# Patient Record
Sex: MALE | NOT HISPANIC OR LATINO | Employment: FULL TIME | URBAN - METROPOLITAN AREA
[De-identification: names, ages, dates, MRNs, and addresses within clinical notes are randomized per-mention and may not be internally consistent; named-entity substitution may affect disease eponyms.]

---

## 2017-05-18 ENCOUNTER — OFFICE VISIT (OUTPATIENT)
Dept: FAMILY MEDICINE | Facility: CLINIC | Age: 32
End: 2017-05-18
Payer: COMMERCIAL

## 2017-05-18 VITALS
HEART RATE: 72 BPM | SYSTOLIC BLOOD PRESSURE: 112 MMHG | DIASTOLIC BLOOD PRESSURE: 80 MMHG | BODY MASS INDEX: 28.89 KG/M2 | RESPIRATION RATE: 16 BRPM | TEMPERATURE: 98 F | WEIGHT: 222 LBS | OXYGEN SATURATION: 99 %

## 2017-05-18 DIAGNOSIS — R11.10 REGURGITATION OF FOOD: ICD-10-CM

## 2017-05-18 DIAGNOSIS — R13.10 DYSPHAGIA, UNSPECIFIED TYPE: ICD-10-CM

## 2017-05-18 DIAGNOSIS — R12 HEART BURN: Primary | ICD-10-CM

## 2017-05-18 DIAGNOSIS — R10.11 RUQ ABDOMINAL PAIN: ICD-10-CM

## 2017-05-18 DIAGNOSIS — R10.13 EPIGASTRIC ABDOMINAL PAIN: ICD-10-CM

## 2017-05-18 LAB
BASOPHILS # BLD AUTO: 0 10E9/L (ref 0–0.2)
BASOPHILS NFR BLD AUTO: 0.3 %
DIFFERENTIAL METHOD BLD: NORMAL
EOSINOPHIL # BLD AUTO: 0.2 10E9/L (ref 0–0.7)
EOSINOPHIL NFR BLD AUTO: 2.4 %
ERYTHROCYTE [DISTWIDTH] IN BLOOD BY AUTOMATED COUNT: 13 % (ref 10–15)
HCT VFR BLD AUTO: 44.5 % (ref 40–53)
HGB BLD-MCNC: 15.2 G/DL (ref 13.3–17.7)
LYMPHOCYTES # BLD AUTO: 2.8 10E9/L (ref 0.8–5.3)
LYMPHOCYTES NFR BLD AUTO: 37.7 %
MCH RBC QN AUTO: 29.9 PG (ref 26.5–33)
MCHC RBC AUTO-ENTMCNC: 34.2 G/DL (ref 31.5–36.5)
MCV RBC AUTO: 88 FL (ref 78–100)
MONOCYTES # BLD AUTO: 0.7 10E9/L (ref 0–1.3)
MONOCYTES NFR BLD AUTO: 9.9 %
NEUTROPHILS # BLD AUTO: 3.7 10E9/L (ref 1.6–8.3)
NEUTROPHILS NFR BLD AUTO: 49.7 %
PLATELET # BLD AUTO: 297 10E9/L (ref 150–450)
RBC # BLD AUTO: 5.08 10E12/L (ref 4.4–5.9)
WBC # BLD AUTO: 7.5 10E9/L (ref 4–11)

## 2017-05-18 PROCEDURE — 83516 IMMUNOASSAY NONANTIBODY: CPT | Performed by: FAMILY MEDICINE

## 2017-05-18 PROCEDURE — 99214 OFFICE O/P EST MOD 30 MIN: CPT | Performed by: FAMILY MEDICINE

## 2017-05-18 PROCEDURE — 83690 ASSAY OF LIPASE: CPT | Performed by: FAMILY MEDICINE

## 2017-05-18 PROCEDURE — 80053 COMPREHEN METABOLIC PANEL: CPT | Performed by: FAMILY MEDICINE

## 2017-05-18 PROCEDURE — 85025 COMPLETE CBC W/AUTO DIFF WBC: CPT | Performed by: FAMILY MEDICINE

## 2017-05-18 PROCEDURE — 83516 IMMUNOASSAY NONANTIBODY: CPT | Mod: 91 | Performed by: FAMILY MEDICINE

## 2017-05-18 PROCEDURE — 36415 COLL VENOUS BLD VENIPUNCTURE: CPT | Performed by: FAMILY MEDICINE

## 2017-05-18 NOTE — LETTER
[facility]   [address] [place]   [address]  [phone]    May 22, 2017      Johan Cole  [address] [facility] [address]              Dear Mr. Cole,    Results within acceptable limits.  -Liver and gallbladder tests are normal. (ALT,AST, Alk phos, bilirubin), kidney function is normal (Cr, GFR), Sodium is normal, Potassium is normal, Calcium is normal, Glucose is normal (diabetes screening test).   -Normal red blood cell (hgb) levels, normal white blood cell count and normal platelet levels.  normal lipase ( no pancreatic inflammation )   All very reassuring results  .    Results for orders placed or performed in visit on 05/18/17   CBC with platelets differential   Result Value Ref Range    WBC 7.5 4.0 - 11.0 10e9/L    RBC Count 5.08 4.4 - 5.9 10e12/L    Hemoglobin 15.2 13.3 - 17.7 g/dL    Hematocrit 44.5 40.0 - 53.0 %    MCV 88 78 - 100 fl    MCH 29.9 26.5 - 33.0 pg    MCHC 34.2 31.5 - 36.5 g/dL    RDW 13.0 10.0 - 15.0 %    Platelet Count 297 150 - 450 10e9/L    Diff Method Automated Method     % Neutrophils 49.7 %    % Lymphocytes 37.7 %    % Monocytes 9.9 %    % Eosinophils 2.4 %    % Basophils 0.3 %    Absolute Neutrophil 3.7 1.6 - 8.3 10e9/L    Absolute Lymphocytes 2.8 0.8 - 5.3 10e9/L    Absolute Monocytes 0.7 0.0 - 1.3 10e9/L    Absolute Eosinophils 0.2 0.0 - 0.7 10e9/L    Absolute Basophils 0.0 0.0 - 0.2 10e9/L   Comprehensive metabolic panel (BMP + Alb, Alk Phos, ALT, AST, Total. Bili, TP)   Result Value Ref Range    Sodium 139 133 - 144 mmol/L    Potassium 4.3 3.4 - 5.3 mmol/L    Chloride 105 94 - 109 mmol/L    Carbon Dioxide 27 20 - 32 mmol/L    Anion Gap 7 3 - 14 mmol/L    Glucose 83 70 - 99 mg/dL    Urea Nitrogen 16 7 - 30 mg/dL    Creatinine 1.00 0.66 - 1.25 mg/dL    GFR Estimate 88 >60 mL/min/1.7m2    GFR Estimate If Black >90   GFR Calc   >60 mL/min/1.7m2    Calcium 9.1 8.5 - 10.1 mg/dL    Bilirubin Total 0.3 0.2 - 1.3 mg/dL     Albumin 4.3 3.4 - 5.0 g/dL    Protein Total 7.7 6.8 - 8.8 g/dL    Alkaline Phosphatase 81 40 - 150 U/L    ALT 46 0 - 70 U/L    AST 25 0 - 45 U/L   Lipase   Result Value Ref Range    Lipase 163 73 - 393 U/L           Sincerely,    Xuan Gilbert MD/nr

## 2017-05-18 NOTE — MR AVS SNAPSHOT
After Visit Summary   5/18/2017    Johan Cole    MRN: [id number]           Patient Information     Date Of Birth          1985        Visit Information        Provider Department      5/18/2017 4:20 PM Xuan Gilbert MD [facility]        Today's Diagnoses     Heart burn    -  1    RUQ abdominal pain        Epigastric abdominal pain        Dysphagia, unspecified type        Regurgitation of food          Care Instructions    Suspect gastritis vs peptic ulcer disease need to rule out gall bladder issues   Cut back on caffeine  Limit alcohol  Eat frequent small meals  Avoid foods that make it worse  Prilosec 20 mg daily 2 weeks then Zantac 150 mg twice a day till see gastro  Labs and stool test today   ultrasound abdomen     Follow up primary in 1 month for recheck  Go to urgent care/ Er if worse         Follow-ups after your visit        Additional Services     GASTROENTEROLOGY ADULT REF CONSULT ONLY       Preferred Location: [facility] [phone], [facility]: [phone] and [facility] [phone]      Please be aware that coverage of these services is subject to the terms and limitations of your health insurance plan.  Call member services at your health plan with any benefit or coverage questions.  Any procedures must be performed at a [facility] facility OR coordinated by your clinic's referral office.    Please bring the following with you to your appointment:    (1) Any X-Rays, CTs or MRIs which have been performed.  Contact the facility where they were done to arrange for  prior to your scheduled appointment.    (2) List of current medications   (3) This referral request   (4) Any documents/labs given to you for this referral                  Your next 10 appointments already scheduled     May 19, 2017  4:00 PM CDT   US ABDOMEN LIMITED with [id number]   [facility] ([facility])    [address]  [facility] [address] [phone]           Please bring a list of your medicines (including vitamins, minerals and over-the-counter drugs). Also, tell your doctor about any allergies you may have. Wear comfortable clothes and leave your valuables at home.  Adults: No eating or drinking for 8 hours before the exam. You may take medicine with a small sip of water.  Children: - Children 6+ years: No food or drink for 6 hours before exam. - Children 1-5 years: No food or drink for 4 hours before exam. - Infants, breast-fed: may have breast milk up to 2 hours before exam. - Infants, formula: may have bottle until 4 hours before exam.  Please call the Imaging Department at your exam site with any questions.            Jun 16, 2017  8:00 AM CDT   (Arrive by 7:45 AM)   New Patient Visit with FRANK Stokes [facility] ([facility])    [address]  [address] [facility] [id number] [phone]              Future tests that were ordered for you today     Open Future Orders        Priority Expected Expires Ordered    US Abdomen Limited Routine  5/18/2018 5/18/2017    H Pylori antigen, stool Routine  6/17/2017 5/18/2017            Who to contact     If you have questions or need follow up information about today's clinic visit or your schedule please contact [facility] directly at [phone].  Normal or non-critical lab and imaging results will be communicated to you by MyChart, letter or phone within 4 business days after the clinic has received the results. If you do not hear from us within 7 days, please contact the clinic through MyChart or phone. If you have a critical or abnormal lab result, we will notify you by phone as soon as possible.  Submit refill requests through [email or web address] or call your pharmacy and they will forward the refill request to us. Please allow 3 business days for your refill to be completed.           "Additional Information About Your Visit        MyChart Information     [employer]text lets you send messages to your doctor, view your test results, renew your prescriptions, schedule appointments and more. To sign up, go to [email or web address] . Click on \"Log in\" on the left side of the screen, which will take you to the Welcome page. Then click on \"Sign up Now\" on the right side of the page.     You will be asked to enter the access code listed below, as well as some personal information. Please follow the directions to create your username and password.     Your access code is: [email or web address]  Expires: 2017  4:52 PM     Your access code will  in 90 days. If you need help or a new code, please call your [facility] clinic or [phone].        Care EveryWhere ID     This is your Care EveryWhere ID. This could be used by other organizations to access your [facility] medical records  [id number]        Your Vitals Were     Pulse Temperature Respirations Pulse Oximetry BMI (Body Mass Index)       72 98  F (36.7  C) (Oral) 16 99% 28.89 kg/m2        Blood Pressure from Last 3 Encounters:   17 112/80   16 108/78   16 100/64    Weight from Last 3 Encounters:   17 222 lb (100.7 kg)   16 210 lb 12 oz (95.6 kg)   16 205 lb 12 oz (93.3 kg)              We Performed the Following     CBC with platelets differential     Comprehensive metabolic panel (BMP + Alb, Alk Phos, ALT, AST, Total. Bili, TP)     GASTROENTEROLOGY ADULT REF CONSULT ONLY     Lipase     Tissue transglutaminase leila IgA and IgG        Primary Care Provider Office Phone # Fax #    Tay Vanessa Christiansen [phone]       [address] [address]        Thank you!     Thank you for choosing [facility]  for your care. Our goal is always to provide you with excellent care. Hearing back from our patients is one way we can continue to improve our " services. Please take a few minutes to complete the written survey that you may receive in the mail after your visit with us. Thank you!             Your Updated Medication List - Protect others around you: Learn how to safely use, store and throw away your medicines at www.disposemymeds.org.          This list is accurate as of: 5/18/17  5:01 PM.  Always use your most recent med list.                   Brand Name Dispense Instructions for use    EPINEPHrine 0.3 MG/0.3ML injection     2 each    Inject 0.3 mLs (0.3 mg) into the muscle once as needed for anaphylaxis       omeprazole 20 MG CR capsule    priLOSEC     20 mg as needed       ZANTAC PO      Take 150 mg by mouth At Bedtime

## 2017-05-18 NOTE — LETTER
[facility]   [address] [place]   [address]  [phone]    May 23, 2017      Johan Cole  [address] [facility] [address]            Dear Mr. Cole,    Results negative for celiac    Results for orders placed or performed in visit on 05/18/17   CBC with platelets differential   Result Value Ref Range    WBC 7.5 4.0 - 11.0 10e9/L    RBC Count 5.08 4.4 - 5.9 10e12/L    Hemoglobin 15.2 13.3 - 17.7 g/dL    Hematocrit 44.5 40.0 - 53.0 %    MCV 88 78 - 100 fl    MCH 29.9 26.5 - 33.0 pg    MCHC 34.2 31.5 - 36.5 g/dL    RDW 13.0 10.0 - 15.0 %    Platelet Count 297 150 - 450 10e9/L    Diff Method Automated Method     % Neutrophils 49.7 %    % Lymphocytes 37.7 %    % Monocytes 9.9 %    % Eosinophils 2.4 %    % Basophils 0.3 %    Absolute Neutrophil 3.7 1.6 - 8.3 10e9/L    Absolute Lymphocytes 2.8 0.8 - 5.3 10e9/L    Absolute Monocytes 0.7 0.0 - 1.3 10e9/L    Absolute Eosinophils 0.2 0.0 - 0.7 10e9/L    Absolute Basophils 0.0 0.0 - 0.2 10e9/L   Comprehensive metabolic panel (BMP + Alb, Alk Phos, ALT, AST, Total. Bili, TP)   Result Value Ref Range    Sodium 139 133 - 144 mmol/L    Potassium 4.3 3.4 - 5.3 mmol/L    Chloride 105 94 - 109 mmol/L    Carbon Dioxide 27 20 - 32 mmol/L    Anion Gap 7 3 - 14 mmol/L    Glucose 83 70 - 99 mg/dL    Urea Nitrogen 16 7 - 30 mg/dL    Creatinine 1.00 0.66 - 1.25 mg/dL    GFR Estimate 88 >60 mL/min/1.7m2    GFR Estimate If Black >90   GFR Calc   >60 mL/min/1.7m2    Calcium 9.1 8.5 - 10.1 mg/dL    Bilirubin Total 0.3 0.2 - 1.3 mg/dL    Albumin 4.3 3.4 - 5.0 g/dL    Protein Total 7.7 6.8 - 8.8 g/dL    Alkaline Phosphatase 81 40 - 150 U/L    ALT 46 0 - 70 U/L    AST 25 0 - 45 U/L   Lipase   Result Value Ref Range    Lipase 163 73 - 393 U/L   Tissue transglutaminase leila IgA and IgG   Result Value Ref Range    Tissue Transglutaminase Antibody IgA 1 <7 U/mL    Tissue Transglutaminase Leila IgG <1  Negative   <7 U/mL            Sincerely,    Xuan Gilbert MD/nr

## 2017-05-18 NOTE — PATIENT INSTRUCTIONS
Suspect gastritis vs peptic ulcer disease need to rule out gall bladder issues   Cut back on caffeine  Limit alcohol  Eat frequent small meals  Avoid foods that make it worse  Prilosec 20 mg daily 2 weeks then Zantac 150 mg twice a day till see gastro  Labs and stool test today   ultrasound abdomen     Follow up primary in 1 month for recheck  Go to urgent care/ Er if worse

## 2017-05-18 NOTE — PROGRESS NOTES
SUBJECTIVE:                                                    Johan Cole is a 32 year old male who presents to clinic today for the following health issues:      GERD/Heartburn  Has had heart burn intermittent past couple yrs, persist past 6 months to point uncomfortable.     Duration: Constant the past 6 months    Description (location/character/radiation): upper middle abdomin    Intensity:  moderate, severe    Accompanying signs and symptoms:  food getting stuck: YES, no trouble swallowing or choking, feels things get bloated  nausea/vomiting/blood: YES, vomiting, throws up undigested food   No weight loss  Gained weight   No night sweats    abdominal pain: YES  black/tarry or bloody stools: no :  Has constipation with omeprazole so tries not to take it as much .   No diarrhea   Using zantac once a day    History (similar episodes/previous evaluation): yes, was seen by Dr. Wan 12/13/16 and said to come in if it got worse    Precipitating or alleviating factors:  worse with fatty foods, spicy foods, caffeinated drinks and alcohol.  current NSAID/Aspirin use: no     Therapies tried and outcome: Omeprazole (Prilosec), Zantac (Ranitidine) and medication temporarily helpful    Zantac 150 mg once a day and then now and then omeprazole few weeks at a time, tries every other day     Past week flet good then kicking up again    Couple weeks ago felt lower abdominal cramping middle lower abdomen, no burning no freq  Felt pressure on bladder ,     No chest pain trouble breathing or palpitations     Goal is to get to a point to not take anything. For stomach even zantac      Problem list and histories reviewed & adjusted, as indicated.  Additional history: as documented    Patient Active Problem List   Diagnosis     CARDIOVASCULAR SCREENING; LDL GOAL LESS THAN 160     Bee sting allergy     History reviewed. No pertinent surgical history.    Social History   Substance Use Topics     Smoking status: Never Smoker      Smokeless tobacco: Never Used     Alcohol use Yes      Comment: 2 drinks a week      History reviewed. No pertinent family history.      Current Outpatient Prescriptions   Medication Sig Dispense Refill     RaNITidine HCl (ZANTAC PO) Take 150 mg by mouth At Bedtime       omeprazole (PRILOSEC) 20 MG CR capsule 20 mg as needed        EPINEPHrine (EPIPEN) 0.3 MG/0.3ML injection Inject 0.3 mLs (0.3 mg) into the muscle once as needed for anaphylaxis 2 each 0     Allergies   Allergen Reactions     Bees Hives     Recent Labs   Lab Test  12/13/16   1056  04/25/16   1223   LDL  87   --    HDL  45   --    TRIG  105   --    ALT   --   37   CR   --   0.79   GFRESTIMATED   --   >90  Non  GFR Calc     GFRESTBLACK   --   >90   GFR Calc     POTASSIUM   --   4.6      BP Readings from Last 3 Encounters:   05/18/17 112/80   12/13/16 108/78   04/25/16 100/64    Wt Readings from Last 3 Encounters:   05/18/17 222 lb (100.7 kg)   12/13/16 210 lb 12 oz (95.6 kg)   04/25/16 205 lb 12 oz (93.3 kg)                  Labs reviewed in EPIC    Reviewed and updated as needed this visit by clinical staff       Reviewed and updated as needed this visit by Provider         ROS:  Constitutional, HEENT, cardiovascular, pulmonary, GI, , musculoskeletal, neuro, skin, endocrine and psych systems are negative, except as otherwise noted.    OBJECTIVE:                                                    /80 (BP Location: Right arm, Patient Position: Chair, Cuff Size: Adult Large)  Pulse 72  Temp 98  F (36.7  C) (Oral)  Resp 16  Wt 222 lb (100.7 kg)  SpO2 99%  BMI 28.89 kg/m2  Body mass index is 28.89 kg/(m^2).  GENERAL: healthy, alert and no distress  EYES: Eyes grossly normal to inspection, PERRL and conjunctivae and sclerae normal  HENT: ear canals and TM's normal, nose and mouth without ulcers or lesions  NECK: no adenopathy, no asymmetry, masses, or scars and thyroid normal to palpation  RESP: lungs clear  to auscultation - no rales, rhonchi or wheezes  CV: regular rate and rhythm, normal S1 S2, no S3 or S4, no murmur, click or rub, no peripheral edema   ABDOMEN: soft, non tender, no hepatosplenomegaly, no masses and bowel sounds normal  MS: no gross musculoskeletal defects noted, no edema  SKIN: no suspicious lesions or rashes  NEURO: Normal strength and tone, mentation intact and speech normal  PSYCH: mentation appears normal, affect normal/bright    Diagnostic Test Results:  Results for orders placed or performed in visit on 05/18/17 (from the past 24 hour(s))   CBC with platelets differential   Result Value Ref Range    WBC 7.5 4.0 - 11.0 10e9/L    RBC Count 5.08 4.4 - 5.9 10e12/L    Hemoglobin 15.2 13.3 - 17.7 g/dL    Hematocrit 44.5 40.0 - 53.0 %    MCV 88 78 - 100 fl    MCH 29.9 26.5 - 33.0 pg    MCHC 34.2 31.5 - 36.5 g/dL    RDW 13.0 10.0 - 15.0 %    Platelet Count 297 150 - 450 10e9/L    Diff Method Automated Method     % Neutrophils 49.7 %    % Lymphocytes 37.7 %    % Monocytes 9.9 %    % Eosinophils 2.4 %    % Basophils 0.3 %    Absolute Neutrophil 3.7 1.6 - 8.3 10e9/L    Absolute Lymphocytes 2.8 0.8 - 5.3 10e9/L    Absolute Monocytes 0.7 0.0 - 1.3 10e9/L    Absolute Eosinophils 0.2 0.0 - 0.7 10e9/L    Absolute Basophils 0.0 0.0 - 0.2 10e9/L        ASSESSMENT/PLAN:                                                      1. Heart burn  Hx of bee sting allergy, GERD on Prilosec, seen by [facility] Dr Christiansen12/2016 for physical . [place]. Here for heart burn worse last 6 month since started new job also coincides with minor increase in stress and excessive caffeine ( 7 to 8 shots of espresso ) a day. Suspect gastritis vs peptic ulcer disease need to rule out gall bladder issues. Advised to cut back on caffeine, Limit alcohol  Eat frequent small meals, Avoid foods that make symptoms worse. Do Prilosec 20 mg daily 2 weeks then Zantac 150 mg twice a day till see gastro. Chronic use of  med's like Prilosec can cause C diff colitis, atypical fractures and pneumonia, vit B 12 and magnesium deficiency so recommended always to use lowest effective dose for shortest duration of time possible.  Call if would lance sucralfate to help with symptom until can see GI. Unclear etiology/diagnosis with uncertain prognosis requiring further workup below. Labs and stool test today. ultrasound abdomen to evaluate for gall stones. Follow up primary Dr Christiansen in 1 month for recheck. Go to urgent care/ Er if worse. Gastro referral if not better made.   - CBC with platelets differential  - Comprehensive metabolic panel (BMP + Alb, Alk Phos, ALT, AST, Total. Bili, TP)  - Lipase  - H Pylori antigen, stool; Future  - GASTROENTEROLOGY ADULT REF CONSULT ONLY  - Tissue transglutaminase leila IgA and IgG  - US Abdomen Limited; Future    2. RUQ abdominal pain  Unclear etiology/diagnosis with uncertain prognosis requiring further workup below.  - CBC with platelets differential  - Comprehensive metabolic panel (BMP + Alb, Alk Phos, ALT, AST, Total. Bili, TP)  - Lipase  - H Pylori antigen, stool; Future  - GASTROENTEROLOGY ADULT REF CONSULT ONLY  - Ranitidine HCl (ZANTAC PO); Take 150 mg by mouth At Bedtime  - Tissue transglutaminase leila IgA and IgG  - US Abdomen Limited; Future    3. Epigastric abdominal pain  Unclear etiology/diagnosis with uncertain prognosis requiring further workup below.  - CBC with platelets differential  - Comprehensive metabolic panel (BMP + Alb, Alk Phos, ALT, AST, Total. Bili, TP)  - Lipase  - H Pylori antigen, stool; Future  - GASTROENTEROLOGY ADULT REF CONSULT ONLY  - Ranitidine HCl (ZANTAC PO); Take 150 mg by mouth At Bedtime  - Tissue transglutaminase leila IgA and IgG  - US Abdomen Limited; Future    4. Dysphagia, unspecified   Unclear etiology/diagnosis with uncertain prognosis requiring further workup below.  - CBC with platelets differential  - Comprehensive metabolic panel (BMP + Alb, Alk Phos,  ALT, AST, Total. Bili, TP)  - Lipase  - H Pylori antigen, stool; Future  - GASTROENTEROLOGY ADULT REF CONSULT ONLY  - Ranitidine HCl (ZANTAC PO); Take 150 mg by mouth At Bedtime  - Tissue transglutaminase leila IgA and IgG  - US Abdomen Limited; Future    5. Regurgitation of food  Unclear etiology/diagnosis with uncertain prognosis requiring further workup below.  - CBC with platelets differential  - Comprehensive metabolic panel (BMP + Alb, Alk Phos, ALT, AST, Total. Bili, TP)  - Lipase  - H Pylori antigen, stool; Future  - GASTROENTEROLOGY ADULT REF CONSULT ONLY  - Ranitidine HCl (ZANTAC PO); Take 150 mg by mouth At Bedtime  - Tissue transglutaminase leila IgA and IgG  - US Abdomen Limited; Future    See Patient Instructions    Xuan Gilbert MD  [facility]

## 2017-05-18 NOTE — NURSING NOTE
"Chief Complaint   Patient presents with     Gastrointestinal Problem       Initial /80 (BP Location: Right arm, Patient Position: Chair, Cuff Size: Adult Large)  Pulse 72  Temp 98  F (36.7  C) (Oral)  Resp 16  Wt 222 lb (100.7 kg)  SpO2 99%  BMI 28.89 kg/m2 Estimated body mass index is 28.89 kg/(m^2) as calculated from the following:    Height as of 12/13/16: 6' 1.5\" (1.867 m).    Weight as of this encounter: 222 lb (100.7 kg).  Medication Reconciliation: complete     Leandra Clements CMA      "

## 2017-05-19 LAB
ALBUMIN SERPL-MCNC: 4.3 G/DL (ref 3.4–5)
ALP SERPL-CCNC: 81 U/L (ref 40–150)
ALT SERPL W P-5'-P-CCNC: 46 U/L (ref 0–70)
ANION GAP SERPL CALCULATED.3IONS-SCNC: 7 MMOL/L (ref 3–14)
AST SERPL W P-5'-P-CCNC: 25 U/L (ref 0–45)
BILIRUB SERPL-MCNC: 0.3 MG/DL (ref 0.2–1.3)
BUN SERPL-MCNC: 16 MG/DL (ref 7–30)
CALCIUM SERPL-MCNC: 9.1 MG/DL (ref 8.5–10.1)
CHLORIDE SERPL-SCNC: 105 MMOL/L (ref 94–109)
CO2 SERPL-SCNC: 27 MMOL/L (ref 20–32)
CREAT SERPL-MCNC: 1 MG/DL (ref 0.66–1.25)
GFR SERPL CREATININE-BSD FRML MDRD: 88 ML/MIN/1.7M2
GLUCOSE SERPL-MCNC: 83 MG/DL (ref 70–99)
LIPASE SERPL-CCNC: 163 U/L (ref 73–393)
POTASSIUM SERPL-SCNC: 4.3 MMOL/L (ref 3.4–5.3)
PROT SERPL-MCNC: 7.7 G/DL (ref 6.8–8.8)
SODIUM SERPL-SCNC: 139 MMOL/L (ref 133–144)

## 2017-05-20 NOTE — PROGRESS NOTES
Results within acceptable limits.  -Liver and gallbladder tests are normal. (ALT,AST, Alk phos, bilirubin), kidney function is normal (Cr, GFR), Sodium is normal, Potassium is normal, Calcium is normal, Glucose is normal (diabetes screening test).   -Normal red blood cell (hgb) levels, normal white blood cell count and normal platelet levels.  normal lipase ( no pancreatic inflammation )   All very reassuring results  .

## 2017-05-22 LAB
TTG IGA SER-ACNC: 1 U/ML
TTG IGG SER-ACNC: NORMAL U/ML

## 2017-06-07 ENCOUNTER — TELEPHONE (OUTPATIENT)
Dept: GASTROENTEROLOGY | Facility: CLINIC | Age: 32
End: 2017-06-07

## 2017-06-07 ENCOUNTER — PRE VISIT (OUTPATIENT)
Dept: GASTROENTEROLOGY | Facility: CLINIC | Age: 32
End: 2017-06-07

## 2017-06-07 NOTE — TELEPHONE ENCOUNTER
1.  Date/reason for appt: 6/16/17 8AM Dx: Heart burn, Epigastric abdominal pain, RUQ abdominal pain, Dysphagia, unspecified type Regurgitation   2.  Referring provider: Vladimir JONES   3.  Call to patient (Yes / No - short description): No, pt was referred.   4.  Previous care at / records requested from:  [facility] Hank Gilbert MD -- Recs are in Epic / Images in PACS

## 2017-06-08 NOTE — TELEPHONE ENCOUNTER
Called pt to check to for ED notes, pt states he no longer needs this appt and asked me to cancel the appt. - closing encounter

## 2018-05-08 ENCOUNTER — OFFICE VISIT (OUTPATIENT)
Dept: FAMILY MEDICINE | Facility: CLINIC | Age: 33
End: 2018-05-08
Payer: COMMERCIAL

## 2018-05-08 VITALS
WEIGHT: 227.25 LBS | BODY MASS INDEX: 28.26 KG/M2 | SYSTOLIC BLOOD PRESSURE: 125 MMHG | TEMPERATURE: 98 F | OXYGEN SATURATION: 97 % | HEIGHT: 75 IN | HEART RATE: 67 BPM | DIASTOLIC BLOOD PRESSURE: 82 MMHG

## 2018-05-08 DIAGNOSIS — R53.83 OTHER FATIGUE: ICD-10-CM

## 2018-05-08 DIAGNOSIS — Z13.1 DIABETES MELLITUS SCREENING: ICD-10-CM

## 2018-05-08 DIAGNOSIS — K21.9 GASTROESOPHAGEAL REFLUX DISEASE, ESOPHAGITIS PRESENCE NOT SPECIFIED: ICD-10-CM

## 2018-05-08 DIAGNOSIS — Z91.030 BEE STING ALLERGY: ICD-10-CM

## 2018-05-08 DIAGNOSIS — Z00.00 ROUTINE GENERAL MEDICAL EXAMINATION AT A HEALTH CARE FACILITY: Primary | ICD-10-CM

## 2018-05-08 DIAGNOSIS — M79.661 RIGHT CALF PAIN: ICD-10-CM

## 2018-05-08 DIAGNOSIS — Z13.6 SCREENING FOR CARDIOVASCULAR CONDITION: ICD-10-CM

## 2018-05-08 PROCEDURE — 99395 PREV VISIT EST AGE 18-39: CPT | Performed by: FAMILY MEDICINE

## 2018-05-08 PROCEDURE — 99213 OFFICE O/P EST LOW 20 MIN: CPT | Mod: 25 | Performed by: FAMILY MEDICINE

## 2018-05-08 RX ORDER — HYDROCODONE BITARTRATE AND ACETAMINOPHEN 5; 325 MG/1; MG/1
1 TABLET ORAL 3 TIMES DAILY PRN
Qty: 10 TABLET | Refills: 0 | Status: SHIPPED | OUTPATIENT
Start: 2018-05-08 | End: 2019-02-26

## 2018-05-08 RX ORDER — EPINEPHRINE 0.3 MG/.3ML
0.3 INJECTION SUBCUTANEOUS
Qty: 2 ML | Refills: 3 | Status: SHIPPED | OUTPATIENT
Start: 2018-05-08 | End: 2019-02-26

## 2018-05-08 ASSESSMENT — PATIENT HEALTH QUESTIONNAIRE - PHQ9
SUM OF ALL RESPONSES TO PHQ QUESTIONS 1-9: 14
10. IF YOU CHECKED OFF ANY PROBLEMS, HOW DIFFICULT HAVE THESE PROBLEMS MADE IT FOR YOU TO DO YOUR WORK, TAKE CARE OF THINGS AT HOME, OR GET ALONG WITH OTHER PEOPLE: VERY DIFFICULT
SUM OF ALL RESPONSES TO PHQ QUESTIONS 1-9: 14

## 2018-05-08 NOTE — LETTER
May 8, 2018      Johan Cole  [address] [address]        To Whom It May Concern:    Johan Cole was seen in our clinic. He may return to work without restrictions on 5/11/18. Please excuse him from work for 5/9 and 5/10      Sincerely,        Tay Christiansen MD, MD

## 2018-05-08 NOTE — MR AVS SNAPSHOT
After Visit Summary   5/8/2018    Johan Cole    MRN: [id number]           Patient Information     Date Of Birth          1985        Visit Information        Provider Department      5/8/2018 4:00 PM Tay Christiansen MD [facility]        Today's Diagnoses     Routine general medical examination at a health care facility    -  1    Diabetes mellitus screening        Screening for cardiovascular condition        Bee sting allergy        Right calf pain          Care Instructions      Preventive Health Recommendations  Male Ages 26 - 39    Yearly exam:             See your health care provider every year in order to  o   Review health changes.   o   Discuss preventive care.    o   Review your medicines if your doctor has prescribed any.    You should be tested each year for STDs (sexually transmitted diseases), if you re at risk.     After age 35, talk to your provider about cholesterol testing. If you are at risk for heart disease, have your cholesterol tested at least every 5 years.     If you are at risk for diabetes, you should have a diabetes test (fasting glucose).  Shots: Get a flu shot each year. Get a tetanus shot every 10 years.     Nutrition:    Eat at least 5 servings of fruits and vegetables daily.     Eat whole-grain bread, whole-wheat pasta and brown rice instead of white grains and rice.     Talk to your provider about Calcium and Vitamin D.     Lifestyle    Exercise for at least 150 minutes a week (30 minutes a day, 5 days a week). This will help you control your weight and prevent disease.     Limit alcohol to one drink per day.     No smoking.     Wear sunscreen to prevent skin cancer.     See your dentist every six months for an exam and cleaning.             Follow-ups after your visit        Additional Services     ORTHO  REFERRAL       [facility] is referring you to the Orthopedic  Services at [facility].       The [facility] Representative will assist you in the coordination of your Orthopedic and Musculoskeletal Care as prescribed by your physician.    The  Representative will call you within 1 business day to help schedule your appointment, or you may contact the [facility] Representative at:    All areas ~ [phone]     Type of Referral : Non Surgical       Timeframe requested: Routine    Coverage of these services is subject to the terms and limitations of your health insurance plan.  Please call member services at your health plan with any benefit or coverage questions.      If X-rays, CT or MRI's have been performed, please contact the facility where they were done to arrange for , prior to your scheduled appointment.  Please bring this referral request to your appointment and present it to your specialist.                  Who to contact     If you have questions or need follow up information about today's clinic visit or your schedule please contact [facility] directly at [phone].  Normal or non-critical lab and imaging results will be communicated to you by MyChart, letter or phone within 4 business days after the clinic has received the results. If you do not hear from us within 7 days, please contact the clinic through [employer] or phone. If you have a critical or abnormal lab result, we will notify you by phone as soon as possible.  Submit refill requests through [employer] or call your pharmacy and they will forward the refill request to us. Please allow 3 business days for your refill to be completed.          Additional Information About Your Visit        [employer] Information     [employer] lets you send messages to your doctor, view your test results, renew your prescriptions, schedule appointments and more. To sign up, go to [email or web address] . Click on \"Log in\" on the left side of the screen, which will take you to the Welcome page. Then click " "on \"Sign up Now\" on the right side of the page.     You will be asked to enter the access code listed below, as well as some personal information. Please follow the directions to create your username and password.     Your access code is: [email or web address]  Expires: 2018  4:27 PM     Your access code will  in 90 days. If you need help or a new code, please call your [place] clinic or [phone].        Care EveryWhere ID     This is your Care EveryWhere ID. This could be used by other organizations to access your [place] medical records  [id number]        Your Vitals Were     Pulse Temperature Height Pulse Oximetry BMI (Body Mass Index)       67 98  F (36.7  C) (Oral) 6' 2.5\" (1.892 m) 97% 28.79 kg/m2        Blood Pressure from Last 3 Encounters:   18 125/82   17 112/80   16 108/78    Weight from Last 3 Encounters:   18 227 lb 4 oz (103.1 kg)   17 222 lb (100.7 kg)   16 210 lb 12 oz (95.6 kg)              We Performed the Following     Glucose     Lipid panel reflex to direct LDL Fasting     ORTHO  REFERRAL          Today's Medication Changes          These changes are accurate as of 18  4:27 PM.  If you have any questions, ask your nurse or doctor.               Start taking these medicines.        Dose/Directions    HYDROcodone-acetaminophen 5-325 MG per tablet   Commonly known as:  NORCO   Used for:  Right calf pain   Started by:  Tay Christiansen MD        Dose:  1 tablet   Take 1 tablet by mouth 3 times daily as needed for severe pain   Quantity:  10 tablet   Refills:  0         Stop taking these medicines if you haven't already. Please contact your care team if you have questions.     omeprazole 20 MG CR capsule   Commonly known as:  priLOSEC   Stopped by:  Tay Christiansen MD                Where to get your medicines      These medications were sent to [facility] [address] [facility] [address] [place] [address] [place] [facility] [place] [address] [employer] [address]  [address] [facility] [address]     Phone:  [phone]     EPINEPHrine 0.3 MG/0.3ML injection 2-pack         Some of these will need a paper prescription and others can be bought over the counter.  Ask your nurse if you have questions.     Bring a paper prescription for each of these medications     HYDROcodone-acetaminophen 5-325 MG per tablet               Information about OPIOIDS     PRESCRIPTION OPIOIDS: WHAT YOU NEED TO KNOW   You have a prescription for an opioid (narcotic) pain medicine. Opioids can cause addiction. If you have a history of chemical dependency of any type, you are at a higher risk of becoming addicted to opioids. Only take this medicine after all other options have been tried. Take it for as short a time and as few doses as possible.     Do not:    Drive. If you drive while taking these medicines, you could be arrested for driving under the influence (DUI).    Operate heavy machinery    Do any other dangerous activities while taking these medicines.     Drink any alcohol while taking these medicines.      Take with any other medicines that contain acetaminophen. Read all labels carefully. Look for the word  acetaminophen  or  Tylenol.  Ask your pharmacist if you have questions or are unsure.    Store your pills in a secure place, locked if possible. We will not replace any lost or stolen medicine. If you don t finish your medicine, please throw away (dispose) as directed by your pharmacist. The [place] Pollution Control Agency has more information about safe disposal: https://www.pca.[facility].mn.us/living-green/managing-unwanted-medications    All opioids tend to cause constipation. Drink plenty of water and eat foods that have a lot of fiber, such as fruits, vegetables, prune juice, apple juice and high-fiber cereal. Take a laxative (Miralax, milk of magnesia, Colace, Senna) if you don t move your bowels at least every other day.          Primary Care  Provider Office Phone # Fax #    Tay Vanessa Christiansen [phone] [address] [facility] [address]        Equal Access to Services     FLORESITA SEGOVIA : Hadii aad ku hadmarjorie Silva, wacharlada luveronica, qaheidita kakarlos moore, kristi spencer sarabriana lucero jazmine hirsch. So [facility] [place] [facility] [phone].    ATENCIÓN: Si habla español, tiene a gil disposición servicios gratuitos de asistencia lingüística. Llame al [phone].    We comply with applicable federal civil rights laws and [place] laws. We do not discriminate on the basis of race, color, national origin, age, disability, sex, sexual orientation, or gender identity.            Thank you!     Thank you for choosing [facility]  for your care. Our goal is always to provide you with excellent care. Hearing back from our patients is one way we can continue to improve our services. Please take a few minutes to complete the written survey that you may receive in the mail after your visit with us. Thank you!             Your Updated Medication List - Protect others around you: Learn how to safely use, store and throw away your medicines at www.disposemymeds.org.          This list is accurate as of 5/8/18  4:27 PM.  Always use your most recent med list.                   Brand Name Dispense Instructions for use Diagnosis    EPINEPHrine 0.3 MG/0.3ML injection 2-pack    EPIPEN/ADRENACLICK/or ANY BX GENERIC EQUIV    2 mL    Inject 0.3 mLs (0.3 mg) into the muscle once as needed for anaphylaxis    Bee sting allergy       HYDROcodone-acetaminophen 5-325 MG per tablet    NORCO    10 tablet    Take 1 tablet by mouth 3 times daily as needed for severe pain    Right calf pain       ZANTAC PO      Take 150 mg by mouth At Bedtime    Epigastric abdominal pain, RUQ abdominal pain, Dysphagia, unspecified type, Regurgitation of food

## 2018-05-08 NOTE — LETTER
[facility]  [address] [place] [id number]  [phone]        November 13, 2018    Johan Cole  [address] [facility] [address]              Dear Johan Cole    This is to remind you that your Fasting lab is due.    You may call our office at [phone] to schedule an appointment.    Please disregard this notice if you have already had your labs drawn or made an appointment.        Sincerely,        Tay Christiansen MD

## 2018-05-08 NOTE — PROGRESS NOTES
SUBJECTIVE:   CC: Johan Cole is an 33 year old male who presents for preventative health visit.     Physical   Annual:     Getting at least 3 servings of Calcium per day::  NO    Bi-annual eye exam::  Yes    Dental care twice a year::  Yes    Sleep apnea or symptoms of sleep apnea::  Daytime drowsiness    Diet::  Regular (no restrictions) and Other    Frequency of exercise::  2-3 days/week    Duration of exercise::  30-45 minutes    Taking medications regularly::  Yes    Medication side effects::  None    Additional concerns today::  YES (calf muscle was hurt last night while playing softball,, it doesnt seem to be sollen but is tender and does radiate behind his knee. pt also wants to discuss his medication list )                     Today's PHQ-2 Score:   PHQ-2 ( 1999 [employer]) 5/8/2018   Q1: Little interest or pleasure in doing things 2   Q2: Feeling down, depressed or hopeless 2   PHQ-2 Score 4   Q1: Little interest or pleasure in doing things More than half the days   Q2: Feeling down, depressed or hopeless More than half the days   PHQ-2 Score 4       Abuse: Current or Past(Physical, Sexual or Emotional)- No  Do you feel safe in your environment - Yes    Social History   Substance Use Topics     Smoking status: Never Smoker     Smokeless tobacco: Never Used     Alcohol use Yes      Comment: 2 drinks a week      Alcohol Use 5/8/2018   If you drink alcohol do you typically have greater than 3 drinks per day OR greater than 7 drinks per week? No   No flowsheet data found.    Last PSA: No results found for: PSA    Reviewed orders with patient. Reviewed health maintenance and updated orders accordingly - Yes  Labs reviewed in EPIC    Reviewed and updated as needed this visit by clinical staff    Reviewed and updated as needed this visit by Provider    Right calf pain.  Was playing softball  - heard pop sound when running. Felt group of muscles are tense. Painful to walk. Hard to bend ankle much. Swollen.   No  "rupture of tendons.     Feeling tired. Gaining weight.     Having heartburn. Using zantac. Symptoms are not completely controlled but manageable.     Review of Systems  C: NEGATIVE for fever, chills, change in weight  I: NEGATIVE for worrisome rashes, moles or lesions  E: NEGATIVE for vision changes or irritation  ENT: NEGATIVE for ear, mouth and throat problems  R: NEGATIVE for significant cough or SOB  CV: NEGATIVE for chest pain, palpitations or peripheral edema  GI: NEGATIVE for nausea, abdominal pain, heartburn, or change in bowel habits   male: negative for dysuria, hematuria, decreased urinary stream, erectile dysfunction, urethral discharge  M: NEGATIVE for significant arthralgias or myalgia  N: NEGATIVE for weakness, dizziness or paresthesias  E: NEGATIVE for temperature intolerance, skin/hair changes  P: NEGATIVE for changes in mood or affect    OBJECTIVE:   /82  Pulse 67  Temp 98  F (36.7  C) (Oral)  Ht 6' 2.5\" (1.892 m)  Wt 227 lb 4 oz (103.1 kg)  SpO2 97%  BMI 28.79 kg/m2    Physical Exam  GENERAL: healthy, alert and no distress  EYES: Eyes grossly normal to inspection, PERRL and conjunctivae and sclerae normal  HENT: ear canals and TM's normal, nose and mouth without ulcers or lesions  NECK: no adenopathy, no asymmetry, masses, or scars and thyroid normal to palpation  RESP: lungs clear to auscultation - no rales, rhonchi or wheezes  CV: regular rate and rhythm, normal S1 S2, no S3 or S4, no murmur, click or rub, no peripheral edema and peripheral pulses strong  ABDOMEN: soft, nontender, no hepatosplenomegaly, no masses and bowel sounds normal   (male): normal male genitalia without lesions or urethral discharge, no hernia  MS: Right posterior calf diffuse swelling-there is mild diffuse tenderness but no palpable deformity.  There is no Achilles tenderness or deformity.  Active range of motion of ankle is painful.  Distal pulses are intact.  SKIN: no suspicious lesions or rashes  NEURO: " Normal strength and tone, mentation intact and speech normal  PSYCH: mentation appears normal, affect normal/bright     ASSESSMENT/PLAN:   1. Routine general medical examination at a health care facility      2. Diabetes mellitus screening    - Glucose; Future    3. Screening for cardiovascular condition    - Lipid panel reflex to direct LDL Fasting; Future    4. Bee sting allergy    - EPINEPHrine (EPIPEN/ADRENACLICK/OR ANY BX GENERIC EQUIV) 0.3 MG/0.3ML injection 2-pack; Inject 0.3 mLs (0.3 mg) into the muscle once as needed for anaphylaxis  Dispense: 2 mL; Refill: 3    5. Right calf pain  -. Unclear etiology.  I suspect tendinosis but tendon injury can not be ruled out.  Due to significant swelling and pain I recommended him to see a sports medicine doctor.  I will provide him a small supply of Norco.  I explained to him about controlled substance and complications related to it.  He also understand is a controlled substance and we do not refill it without seen.  I also provided him a note for his work where he needs to stand on his feet for hours.  - I strongly recommended him to use ice multiple times per day.   - HYDROcodone-acetaminophen (NORCO) 5-325 MG per tablet; Take 1 tablet by mouth 3 times daily as needed for severe pain  Dispense: 10 tablet; Refill: 0  - ORTHO  REFERRAL    6. Gastroesophageal reflux disease, esophagitis presence not specified  Stick with ranitidine and OK to use PPI as needed.     7. Other fatigue  Unclear etiology. Not related to above. Obtain labs but he would like to come back for that.   - CBC with platelets; Future  - Vitamin D Deficiency; Future  - TSH with free T4 reflex; Future    COUNSELING:   Reviewed preventive health counseling, as reflected in patient instructions  Special attention given to:        Regular exercise       Healthy diet/nutrition       Vision screening       Hearing screening         reports that he has never smoked. He has never used smokeless  "tobacco.    Estimated body mass index is 28.89 kg/(m^2) as calculated from the following:    Height as of 12/13/16: 6' 1.5\" (1.867 m).    Weight as of 5/18/17: 222 lb (100.7 kg).   Weight management plan: Discussed healthy diet and exercise guidelines and patient will follow up in 12 months in clinic to re-evaluate.    Counseling Resources:  ATP IV Guidelines  Pooled Cohorts Equation Calculator  FRAX Risk Assessment  ICSI Preventive Guidelines  Dietary Guidelines for Americans, 2010  USDA's MyPlate  ASA Prophylaxis  Lung CA Screening    Tay Christiansen MD  [facility]  Answers for HPI/ROS submitted by the patient on 5/8/2018   PHQ-2 Score: 4  If you checked off any problems, how difficult have these problems made it for you to do your work, take care of things at home, or get along with other people?: Very difficult  PHQ9 TOTAL SCORE: 14    "

## 2018-05-09 ASSESSMENT — PATIENT HEALTH QUESTIONNAIRE - PHQ9: SUM OF ALL RESPONSES TO PHQ QUESTIONS 1-9: 14

## 2018-05-10 ENCOUNTER — OFFICE VISIT (OUTPATIENT)
Dept: ORTHOPEDICS | Facility: CLINIC | Age: 33
End: 2018-05-10
Payer: COMMERCIAL

## 2018-05-10 VITALS
BODY MASS INDEX: 28.23 KG/M2 | SYSTOLIC BLOOD PRESSURE: 116 MMHG | HEIGHT: 75 IN | WEIGHT: 227 LBS | DIASTOLIC BLOOD PRESSURE: 74 MMHG

## 2018-05-10 DIAGNOSIS — S86.811A STRAIN OF CALF MUSCLE, RIGHT, INITIAL ENCOUNTER: Primary | ICD-10-CM

## 2018-05-10 PROCEDURE — 76882 US LMTD JT/FCL EVL NVASC XTR: CPT | Performed by: FAMILY MEDICINE

## 2018-05-10 PROCEDURE — 99243 OFF/OP CNSLTJ NEW/EST LOW 30: CPT | Mod: 25 | Performed by: FAMILY MEDICINE

## 2018-05-10 NOTE — PROGRESS NOTES
"ASSESSMENT & PLAN    1. Strain of calf muscle, right, initial encounter      Physical therapy: [facility] - [phone]  Discussed exam and ultrasound - grade 1 strain  Letter for work: no climbing ladders or push/pulling more than 20 pounds x 4 weeks    Follow up after 4 therapy sessions if not improving.    -----    SUBJECTIVE  Johan Cole is a/an 33 year old male who is seen in consultation at the request of  Tay Christiansen M.D. for evaluation of right knee pain. The patient is seen by themselves.    Onset: 3 day(s) ago. Patient describes injury as he felt a tear/pop in the right calf while sprinting out of the batters box while playing softball  Location of Pain: right calf- posterior/medial in the muscle belly  Rating of Pain at worst: 9/10  Rating of Pain Currently: 3/10  Worsened by: toeing off while walking, stretching, going up onto his toes  Better with: rest  Treatments tried: no treatment tried to date  Associated symptoms: swelling  Orthopedic history: DVT in right leg  Relevant surgical history: NO  Patient Social History: works as an      Patient's past medical, surgical, social, and family histories were reviewed today and no changes are noted.    REVIEW OF SYSTEMS:  10 point ROS is negative other than symptoms noted above in HPI, Past Medical History or as stated below  Constitutional: NEGATIVE for fever, chills, change in weight  Skin: NEGATIVE for worrisome rashes, moles or lesions  GI/: NEGATIVE for bowel or bladder changes  Neuro: NEGATIVE for weakness, dizziness or paresthesias    OBJECTIVE:  /74  Ht 6' 2.5\" (1.892 m)  Wt 227 lb (103 kg)  BMI 28.76 kg/m2   General: healthy, alert and in no distress  HEENT: no scleral icterus or conjunctival erythema  Skin: no suspicious lesions or rash. No jaundice.  CV: no pedal edema  Resp: normal respiratory effort without conversational dyspnea   Psych: normal mood and affect  Gait: normal steady " gait with appropriate coordination and balance  Neuro: Normal light sensory exam of lower extremity  MSK:  RIGHT CALF  Inspection:    normal alignment  Palpation:    Tender about the medial gastroc and septum at about the midportion of the calf. Remainder of bony and ligamentous landmarks are nontender.    No effusion is present  Range of Motion:   Full active plantar and dorsiflexion of the ankle  Strength:  Pain with strength testing of the calf    Independent visualization of the below image:  Limited US scan of the right calf:  History: Pain and Swelling  Findings: Scan of the posterior calf shows strain at septum of the medial/lateral gastroc.  No discrete hematoma within the medial gastroc.  Questionable strain with the soleus, medial / distal aspect.  No proximal tendinous tearing. Intact achilles. Interpretation: 1) Calf strain - grade 1    Patient's conditions were thoroughly discussed during today's visit with greater than 50% of the visit spent counseling the patient with total time spent face-to-face with the patient being 25 minutes ultrasound taking 10 minutes    Ruben Shaw DO [facility]

## 2018-05-10 NOTE — PATIENT INSTRUCTIONS
1. Strain of calf muscle, right, initial encounter      Physical therapy: [facility] - [phone]  Discussed exam and ultrasound - grade 1 strain  Letter for work: no climbing ladders or push/pulling more than 20 pounds x 4 weeks    Follow up after 4 therapy sessions if not improving.

## 2018-05-10 NOTE — LETTER
"    5/10/2018         RE: Johan Cole  [address] [facility] [address]        Dear Colleague,    Thank you for referring your patient, Johan Cole, to the [facility]. Please see a copy of my visit note below.    ASSESSMENT & PLAN    1. Strain of calf muscle, right, initial encounter      Physical therapy: [facility] - [phone]  Discussed exam and ultrasound - grade 1 strain  Letter for work: no climbing ladders or push/pulling more than 20 pounds x 4 weeks    Follow up after 4 therapy sessions if not improving.    -----    SUBJECTIVE  Johan Cole is a/an 33 year old male who is seen in consultation at the request of  Tay Christiansen M.D. for evaluation of right knee pain. The patient is seen by themselves.    Onset: 3 day(s) ago. Patient describes injury as he felt a tear/pop in the right calf while sprinting out of the batters box while playing softball  Location of Pain: right calf- posterior/medial in the muscle belly  Rating of Pain at worst: 9/10  Rating of Pain Currently: 3/10  Worsened by: toeing off while walking, stretching, going up onto his toes  Better with: rest  Treatments tried: no treatment tried to date  Associated symptoms: swelling  Orthopedic history: DVT in right leg  Relevant surgical history: NO  Patient Social History: works as an      Patient's past medical, surgical, social, and family histories were reviewed today and no changes are noted.    REVIEW OF SYSTEMS:  10 point ROS is negative other than symptoms noted above in HPI, Past Medical History or as stated below  Constitutional: NEGATIVE for fever, chills, change in weight  Skin: NEGATIVE for worrisome rashes, moles or lesions  GI/: NEGATIVE for bowel or bladder changes  Neuro: NEGATIVE for weakness, dizziness or paresthesias    OBJECTIVE:  /74  Ht 6' 2.5\" (1.892 m)  Wt 227 lb (103 kg)  BMI 28.76 kg/m2   General: healthy, alert and " in no distress  HEENT: no scleral icterus or conjunctival erythema  Skin: no suspicious lesions or rash. No jaundice.  CV: no pedal edema  Resp: normal respiratory effort without conversational dyspnea   Psych: normal mood and affect  Gait: normal steady gait with appropriate coordination and balance  Neuro: Normal light sensory exam of lower extremity  MSK:  RIGHT CALF  Inspection:    normal alignment  Palpation:    Tender about the medial gastroc and septum at about the midportion of the calf. Remainder of bony and ligamentous landmarks are nontender.    No effusion is present  Range of Motion:   Full active plantar and dorsiflexion of the ankle  Strength:  Pain with strength testing of the calf    Independent visualization of the below image:  Limited US scan of the right calf:  History: Pain and Swelling  Findings: Scan of the posterior calf shows strain at septum of the medial/lateral gastroc.  No discrete hematoma within the medial gastroc.  Questionable strain with the soleus, medial / distal aspect.  No proximal tendinous tearing. Intact achilles. Interpretation: 1) Calf strain - grade 1    Patient's conditions were thoroughly discussed during today's visit with greater than 50% of the visit spent counseling the patient with total time spent face-to-face with the patient being 25 minutes ultrasound taking 10 minutes    Ruben Shaw DO [facility]      Again, thank you for allowing me to participate in the care of your patient.        Sincerely,        Ruben Shaw DO

## 2018-05-10 NOTE — LETTER
May 10, 2018      Johan Cole  [address] [address]      To whom it may concern:    RE: Johan Cole    Patient was seen and treated today at our clinic for an acute calf injury. Due to his injuries it is recommend that he not climb ladders or push/pull any objects heavier than 20 pounds for the next 4 weeks.    Please contact me for questions or concerns.    Sincerely,        Ruben Shaw DO, [facility]  [facility] on behalf of Dr. Shaw

## 2018-05-10 NOTE — MR AVS SNAPSHOT
After Visit Summary   5/10/2018    Johan Cole    MRN: [id number]           Patient Information     Date Of Birth          1985        Visit Information        Provider Department      5/10/2018 10:00 AM Ruben Shaw, DO [facility]        Today's Diagnoses     Strain of calf muscle, right, initial encounter    -  1      Care Instructions    1. Strain of calf muscle, right, initial encounter      Physical therapy: [place] [facility] - [phone]  Discussed exam and ultrasound - grade 1 strain  Letter for work: no climbing ladders or push/pulling more than 20 pounds x 4 weeks    Follow up after 4 therapy sessions if not improving.          Follow-ups after your visit        Additional Services     [facility] PT, HAND, AND CHIROPRACTIC REFERRAL       **This order will print in the [facility] Scheduling Office**    Physical Therapy, Hand Therapy and Chiropractic Care are available through:    *[place]  *[facility]  *[facility]    Call one number to schedule at any of the above locations: [phone].    Your provider has referred you to: Physical Therapy at [facility] or [facility]    Indication/Reason for Referral: R medial gastroc/soleous strain  Onset of Illness: acute  Therapy Orders: Evaluate and Treat  Special Programs: None  Special Request: None    Aleida Shin      Additional Comments for the Therapist or Chiropractor:     Please be aware that coverage of these services is subject to the terms and limitations of your health insurance plan.  Call member services at your health plan with any benefit or coverage questions.      Please bring the following to your appointment:    *Your personal calendar for scheduling future appointments  *Comfortable clothing                  Who to contact     If you have questions or need follow up information about today's clinic visit or your schedule please contact [facility]  "[facility] directly at [phone].  Normal or non-critical lab and imaging results will be communicated to you by MyChart, letter or phone within 4 business days after the clinic has received the results. If you do not hear from us within 7 days, please contact the clinic through [employer] or phone. If you have a critical or abnormal lab result, we will notify you by phone as soon as possible.  Submit refill requests through [employer] or call your pharmacy and they will forward the refill request to us. Please allow 3 business days for your refill to be completed.          Additional Information About Your Visit        [employer] Information     [employer] lets you send messages to your doctor, view your test results, renew your prescriptions, schedule appointments and more. To sign up, go to [email or web address] . Click on \"Log in\" on the left side of the screen, which will take you to the Welcome page. Then click on \"Sign up Now\" on the right side of the page.     You will be asked to enter the access code listed below, as well as some personal information. Please follow the directions to create your username and password.     Your access code is: [email or web address]  Expires: 2018  4:27 PM     Your access code will  in 90 days. If you need help or a new code, please call your [place] clinic or [phone].        Care EveryWhere ID     This is your Care EveryWhere ID. This could be used by other organizations to access your [place] medical records  [id number]        Your Vitals Were     Height BMI (Body Mass Index)                6' 2.5\" (1.892 m) 28.76 kg/m2           Blood Pressure from Last 3 Encounters:   05/10/18 116/74   18 125/82   17 112/80    Weight from Last 3 Encounters:   05/10/18 227 lb (103 kg)   18 227 lb 4 oz (103.1 kg)   17 222 lb (100.7 kg)              We Performed the Following     [facility] PT, HAND, AND CHIROPRACTIC REFERRAL        Primary Care Provider " Office Phone # Fax #    Tay Vanessa Christiansen [phone] [address] [facility] [address]        Equal Access to Services     FLORESITA SEGOVIA : Hadii aad ku hadbeenagail Remediosbenedicto, wacharlada luveronica, qaheidita kakarlos moore, kristi spencer sarabriana lucero jazmine hirsch. So [facility] [phone].    ATENCIÓN: Si habla español, tiene a gil disposición servicios gratuitos de asistencia lingüística. Llame al [phone].    We comply with applicable federal civil rights laws and [place] laws. We do not discriminate on the basis of race, color, national origin, age, disability, sex, sexual orientation, or gender identity.            Thank you!     Thank you for choosing [facility]  for your care. Our goal is always to provide you with excellent care. Hearing back from our patients is one way we can continue to improve our services. Please take a few minutes to complete the written survey that you may receive in the mail after your visit with us. Thank you!             Your Updated Medication List - Protect others around you: Learn how to safely use, store and throw away your medicines at www.disposemymeds.org.          This list is accurate as of 5/10/18 10:49 AM.  Always use your most recent med list.                   Brand Name Dispense Instructions for use Diagnosis    EPINEPHrine 0.3 MG/0.3ML injection 2-pack    EPIPEN/ADRENACLICK/or ANY BX GENERIC EQUIV    2 mL    Inject 0.3 mLs (0.3 mg) into the muscle once as needed for anaphylaxis    Bee sting allergy       HYDROcodone-acetaminophen 5-325 MG per tablet    NORCO    10 tablet    Take 1 tablet by mouth 3 times daily as needed for severe pain    Right calf pain       ZANTAC PO      Take 150 mg by mouth At Bedtime    Epigastric abdominal pain, RUQ abdominal pain, Dysphagia, unspecified type, Regurgitation of food

## 2018-05-21 ENCOUNTER — OFFICE VISIT (OUTPATIENT)
Dept: FAMILY MEDICINE | Facility: CLINIC | Age: 33
End: 2018-05-21
Payer: COMMERCIAL

## 2018-05-21 VITALS
OXYGEN SATURATION: 98 % | TEMPERATURE: 97.2 F | WEIGHT: 227 LBS | HEART RATE: 73 BPM | SYSTOLIC BLOOD PRESSURE: 126 MMHG | DIASTOLIC BLOOD PRESSURE: 88 MMHG | RESPIRATION RATE: 14 BRPM | BODY MASS INDEX: 28.76 KG/M2

## 2018-05-21 DIAGNOSIS — J32.9 VIRAL SINUSITIS: ICD-10-CM

## 2018-05-21 DIAGNOSIS — B97.89 VIRAL SINUSITIS: ICD-10-CM

## 2018-05-21 DIAGNOSIS — H00.015 HORDEOLUM EXTERNUM OF LEFT LOWER EYELID: Primary | ICD-10-CM

## 2018-05-21 PROCEDURE — 99213 OFFICE O/P EST LOW 20 MIN: CPT | Performed by: FAMILY MEDICINE

## 2018-05-21 NOTE — PROGRESS NOTES
SUBJECTIVE:   Johan Cole is a 33 year old male who presents to clinic today for the following health issues:      Friday morning he has noticed soreness in the left lower eye lid. Since then it has become more swollen and red. No drainage, eye pain or vision changes. No eye trauma.     He has been experiencing intermittent sinus symptoms over the winter. Over the weekend he started experiencing left nostril dryness, nasal drainage (green) and frontal pressure. He doesn't take OTC medication.     No sneezing or itchy eyes.       Problem list and histories reviewed & adjusted, as indicated.  Additional history: as documented    Labs reviewed in EPIC    Reviewed and updated as needed this visit by clinical staff       Reviewed and updated as needed this visit by Provider         ROS:  Constitutional, HEENT, cardiovascular, pulmonary, gi and gu systems are negative, except as otherwise noted.    OBJECTIVE:     /88  Pulse 73  Temp 97.2  F (36.2  C) (Tympanic)  Resp 14  Wt 227 lb (103 kg)  SpO2 98%  BMI 28.76 kg/m2  Body mass index is 28.76 kg/(m^2).  GENERAL: healthy, alert and no distress  EYES:  PERRL and conjunctivae and sclerae normal, erythematous nodule on the left lower (inner) eyelid   HENT:  nose and mouth without ulcers or lesions      Diagnostic Test Results:  none     ASSESSMENT/PLAN:     1. Hordeolum externum of left lower eyelid  - advised to use warm compresses few times/day   - if symptoms do not improve pt would benefit from opthalmic abx ointment     2. Viral sinusitis  - advised symptomatic tx     Mario Alberto Pham MD  [facility]

## 2018-05-21 NOTE — MR AVS SNAPSHOT
"              After Visit Summary   2018    Johan Cole    MRN: [id number]           Patient Information     Date Of Birth          1985        Visit Information        Provider Department      2018 3:40 PM Mario lAberto Pham MD [facility]        Today's Diagnoses     Hordeolum externum of left lower eyelid    -  1    Viral sinusitis           Follow-ups after your visit        Who to contact     If you have questions or need follow up information about today's clinic visit or your schedule please contact [facility] directly at [phone].  Normal or non-critical lab and imaging results will be communicated to you by MyChart, letter or phone within 4 business days after the clinic has received the results. If you do not hear from us within 7 days, please contact the clinic through [employer] or phone. If you have a critical or abnormal lab result, we will notify you by phone as soon as possible.  Submit refill requests through [employer] or call your pharmacy and they will forward the refill request to us. Please allow 3 business days for your refill to be completed.          Additional Information About Your Visit        MyChart Information     [employer] lets you send messages to your doctor, view your test results, renew your prescriptions, schedule appointments and more. To sign up, go to [email or web address] . Click on \"Log in\" on the left side of the screen, which will take you to the Welcome page. Then click on \"Sign up Now\" on the right side of the page.     You will be asked to enter the access code listed below, as well as some personal information. Please follow the directions to create your username and password.     Your access code is: [email or web address]  Expires: 2018  4:27 PM     Your access code will  in 90 days. If you need help or a new code, please call your [facility] or [phone].        Care EveryWhere ID     This is your Care " EveryWhere ID. This could be used by other organizations to access your [place] medical records  [id number]        Your Vitals Were     Pulse Temperature Respirations Pulse Oximetry BMI (Body Mass Index)       73 97.2  F (36.2  C) (Tympanic) 14 98% 28.76 kg/m2        Blood Pressure from Last 3 Encounters:   05/21/18 126/88   05/10/18 116/74   05/08/18 125/82    Weight from Last 3 Encounters:   05/21/18 227 lb (103 kg)   05/10/18 227 lb (103 kg)   05/08/18 227 lb 4 oz (103.1 kg)              Today, you had the following     No orders found for display       Primary Care Provider Office Phone # Fax #    Tay Vanessa Christiansen [phone] [address] [place] [address]        Equal Access to Services     FLORESITA SEGOVIA : Hadshima Silva, waaxda gaviota, qaybta kaalmaricruz moore, kristi lucero . So [facility] [phone].    ATENCIÓN: Si habla español, tiene a gil disposición servicios gratuitos de asistencia lingüística. Cosme al [phone].    We comply with applicable federal civil rights laws and [place] laws. We do not discriminate on the basis of race, color, national origin, age, disability, sex, sexual orientation, or gender identity.            Thank you!     Thank you for choosing [facility]  for your care. Our goal is always to provide you with excellent care. Hearing back from our patients is one way we can continue to improve our services. Please take a few minutes to complete the written survey that you may receive in the mail after your visit with us. Thank you!             Your Updated Medication List - Protect others around you: Learn how to safely use, store and throw away your medicines at www.disposemymeds.org.          This list is accurate as of 5/21/18 11:59 PM.  Always use your most recent med list.                   Brand Name Dispense Instructions for use Diagnosis    EPINEPHrine 0.3 MG/0.3ML injection  2-pack    EPIPEN/ADRENACLICK/or ANY BX GENERIC EQUIV    2 mL    Inject 0.3 mLs (0.3 mg) into the muscle once as needed for anaphylaxis    Bee sting allergy       HYDROcodone-acetaminophen 5-325 MG per tablet    NORCO    10 tablet    Take 1 tablet by mouth 3 times daily as needed for severe pain    Right calf pain       ZANTAC PO      Take 150 mg by mouth At Bedtime    Epigastric abdominal pain, RUQ abdominal pain, Dysphagia, unspecified type, Regurgitation of food

## 2018-12-18 ENCOUNTER — TELEPHONE (OUTPATIENT)
Dept: LAB | Facility: CLINIC | Age: 33
End: 2018-12-18

## 2018-12-18 NOTE — TELEPHONE ENCOUNTER
Labs on 18 have  and letter was sent on 18.    Please close (not just done) this encounter if nothing more needs to be done with it.    Thanks,  lab

## 2019-02-26 ENCOUNTER — OFFICE VISIT (OUTPATIENT)
Dept: FAMILY MEDICINE | Facility: CLINIC | Age: 34
End: 2019-02-26
Payer: COMMERCIAL

## 2019-02-26 VITALS
OXYGEN SATURATION: 100 % | DIASTOLIC BLOOD PRESSURE: 87 MMHG | TEMPERATURE: 98.1 F | HEART RATE: 64 BPM | WEIGHT: 232.5 LBS | RESPIRATION RATE: 16 BRPM | SYSTOLIC BLOOD PRESSURE: 133 MMHG | BODY MASS INDEX: 31.49 KG/M2 | HEIGHT: 72 IN

## 2019-02-26 DIAGNOSIS — K21.9 GASTROESOPHAGEAL REFLUX DISEASE, ESOPHAGITIS PRESENCE NOT SPECIFIED: Primary | ICD-10-CM

## 2019-02-26 DIAGNOSIS — Z23 NEED FOR PROPHYLACTIC VACCINATION AND INOCULATION AGAINST INFLUENZA: ICD-10-CM

## 2019-02-26 DIAGNOSIS — R10.9 STOMACH PAIN: ICD-10-CM

## 2019-02-26 LAB
ERYTHROCYTE [DISTWIDTH] IN BLOOD BY AUTOMATED COUNT: 13.1 % (ref 10–15)
HCT VFR BLD AUTO: 44.9 % (ref 40–53)
HGB BLD-MCNC: 15.1 G/DL (ref 13.3–17.7)
MCH RBC QN AUTO: 29.7 PG (ref 26.5–33)
MCHC RBC AUTO-ENTMCNC: 33.6 G/DL (ref 31.5–36.5)
MCV RBC AUTO: 88 FL (ref 78–100)
PLATELET # BLD AUTO: 286 10E9/L (ref 150–450)
RBC # BLD AUTO: 5.08 10E12/L (ref 4.4–5.9)
WBC # BLD AUTO: 6.8 10E9/L (ref 4–11)

## 2019-02-26 PROCEDURE — 85027 COMPLETE CBC AUTOMATED: CPT | Performed by: FAMILY MEDICINE

## 2019-02-26 PROCEDURE — 90686 IIV4 VACC NO PRSV 0.5 ML IM: CPT | Performed by: FAMILY MEDICINE

## 2019-02-26 PROCEDURE — 80053 COMPREHEN METABOLIC PANEL: CPT | Performed by: FAMILY MEDICINE

## 2019-02-26 PROCEDURE — 36415 COLL VENOUS BLD VENIPUNCTURE: CPT | Performed by: FAMILY MEDICINE

## 2019-02-26 PROCEDURE — 83516 IMMUNOASSAY NONANTIBODY: CPT | Performed by: FAMILY MEDICINE

## 2019-02-26 PROCEDURE — 99214 OFFICE O/P EST MOD 30 MIN: CPT | Mod: 25 | Performed by: FAMILY MEDICINE

## 2019-02-26 PROCEDURE — 90471 IMMUNIZATION ADMIN: CPT | Performed by: FAMILY MEDICINE

## 2019-02-26 PROCEDURE — 84443 ASSAY THYROID STIM HORMONE: CPT | Performed by: FAMILY MEDICINE

## 2019-02-26 PROCEDURE — 83516 IMMUNOASSAY NONANTIBODY: CPT | Mod: 59 | Performed by: FAMILY MEDICINE

## 2019-02-26 ASSESSMENT — ANXIETY QUESTIONNAIRES
6. BECOMING EASILY ANNOYED OR IRRITABLE: NOT AT ALL
7. FEELING AFRAID AS IF SOMETHING AWFUL MIGHT HAPPEN: NOT AT ALL
1. FEELING NERVOUS, ANXIOUS, OR ON EDGE: NEARLY EVERY DAY
GAD7 TOTAL SCORE: 4
IF YOU CHECKED OFF ANY PROBLEMS ON THIS QUESTIONNAIRE, HOW DIFFICULT HAVE THESE PROBLEMS MADE IT FOR YOU TO DO YOUR WORK, TAKE CARE OF THINGS AT HOME, OR GET ALONG WITH OTHER PEOPLE: SOMEWHAT DIFFICULT
2. NOT BEING ABLE TO STOP OR CONTROL WORRYING: NOT AT ALL
3. WORRYING TOO MUCH ABOUT DIFFERENT THINGS: NOT AT ALL
5. BEING SO RESTLESS THAT IT IS HARD TO SIT STILL: NOT AT ALL

## 2019-02-26 ASSESSMENT — MIFFLIN-ST. JEOR: SCORE: 2042.61

## 2019-02-26 ASSESSMENT — PATIENT HEALTH QUESTIONNAIRE - PHQ9: 5. POOR APPETITE OR OVEREATING: SEVERAL DAYS

## 2019-02-26 NOTE — PATIENT INSTRUCTIONS
Patient Education     Tips to Control Acid Reflux    To control acid reflux, you ll need to make some basic diet and lifestyle changes. The simple steps outlined below may be all you ll need to ease discomfort.  Watch what you eat    Avoid fatty foods and spicy foods.    Eat fewer acidic foods, such as citrus and tomato-based foods. These can increase symptoms.    Limit drinking alcohol, caffeine, and fizzy beverages. All increase acid reflux.    Try limiting chocolate, peppermint, and spearmint. These can worsen acid reflux in some people.  Watch when you eat    Avoid lying down for 3 hours after eating.    Do not snack before going to bed.  Raise your head  Raising your head and upper body by 4 to 6 inches helps limit reflux when you re lying down. Put blocks under the head of your bed frame to raise it.  Other changes    Lose weight, if you need to    Don t exercise near bedtime    Avoid tight-fitting clothes    Limit aspirin and ibuprofen    Stop smoking   Date Last Reviewed: 7/1/2016 2000-2018 [employer]. [address] [place] [address], [place] [address]. All rights reserved. This information is not intended as a substitute for professional medical care. Always follow your healthcare professional's instructions.

## 2019-02-26 NOTE — LETTER
March 1, 2019      Johan Cole  [address] [facility] [address]        Dear Johan,    Here are your recent test results:    Your celiac disease test, thyroid,Kidney, white count, hemoglobin, platelets are within normal limit. One of your liver enzyme is slightly on higher side, if you drink any alcohol please quit it completely.    Results for orders placed or performed in visit on 02/26/19   Tissue transglutaminase leila IgA and IgG   Result Value Ref Range    Tissue Transglutaminase Antibody IgA 1 <7 U/mL    Tissue Transglutaminase Leila IgG <1 <7 U/mL   CBC with platelets   Result Value Ref Range    WBC 6.8 4.0 - 11.0 10e9/L    RBC Count 5.08 4.4 - 5.9 10e12/L    Hemoglobin 15.1 13.3 - 17.7 g/dL    Hematocrit 44.9 40.0 - 53.0 %    MCV 88 78 - 100 fl    MCH 29.7 26.5 - 33.0 pg    MCHC 33.6 31.5 - 36.5 g/dL    RDW 13.1 10.0 - 15.0 %    Platelet Count 286 150 - 450 10e9/L   Comprehensive metabolic panel   Result Value Ref Range    Sodium 137 133 - 144 mmol/L    Potassium 4.1 3.4 - 5.3 mmol/L    Chloride 104 94 - 109 mmol/L    Carbon Dioxide 28 20 - 32 mmol/L    Anion Gap 5 3 - 14 mmol/L    Glucose 86 70 - 99 mg/dL    Urea Nitrogen 14 7 - 30 mg/dL    Creatinine 0.92 0.66 - 1.25 mg/dL    GFR Estimate >90 >60 mL/min/[1.73_m2]    GFR Estimate If Black >90 >60 mL/min/[1.73_m2]    Calcium 9.4 8.5 - 10.1 mg/dL    Bilirubin Total 0.4 0.2 - 1.3 mg/dL    Albumin 4.4 3.4 - 5.0 g/dL    Protein Total 7.9 6.8 - 8.8 g/dL    Alkaline Phosphatase 78 40 - 150 U/L    ALT 67 0 - 70 U/L    AST 58 (H) 0 - 45 U/L   TSH with free T4 reflex   Result Value Ref Range    TSH 1.87 0.40 - 4.00 mU/L               Sincerely,        Tay Christiansen MD/tabatha

## 2019-02-26 NOTE — PROGRESS NOTES
SUBJECTIVE:   Johan Cole is a 34 year old male who presents to clinic today for the following health issues:      Gastrointestinal symptoms      Duration:  5years     Description:           REFLUX SYMPTOMS - heartburn, regurgitation of food, light stools and bloody stools a month ago for 2 weeks intermittent       Intensity:  Severe untreated     Accompanying signs and symptoms:  mucus in stools and bloating and irregular BM    History  Previous {similar problem: YES  Previous evaluation:  Ultrasound for ulcers     Aggravating factors: gluten, tomatoes, beer, caffeine, spicy foods     Alleviating factors: nothing    Other Therapies tried: changed diet somewhat helpful and zantac, tried omeprazole     Zantac, omeprazole - band aid  Elimination diet.     Depends upon what he eats - pain, heartburn, some irregular bowel movement.  Recently some blood and mucus in stool.   Some constipation to diarrhea in same day.   Food - acidic food worsens it.   Wondering about gluten intolerance.   Feels it is hard to quit it completely.   No nausea or vomiting. No black stool.   No regular painkiller use.   No FH of crohns, ulcerative colitis or gluten intolerance.   Omeprazole - constipation and bloated. It helped with symptoms.   Zantac - once per day and sometime twice per day. It helps.   tums does not help much.     Problem list and histories reviewed & adjusted, as indicated.  Additional history: as documented    Labs reviewed in EPIC    Reviewed and updated as needed this visit by clinical staff    Reviewed and updated as needed this visit by Provider       Social History     Occupational History     Not on file   Tobacco Use     Smoking status: Never Smoker     Smokeless tobacco: Never Used   Substance and Sexual Activity     Alcohol use: Yes     Comment: 2 drinks a week      Drug use: No     Sexual activity: Yes     Partners: Female     Birth control/protection: None     Allergies   Allergen Reactions     Bees  Hives     Patient Active Problem List   Diagnosis     CARDIOVASCULAR SCREENING; LDL GOAL LESS THAN 160     Bee sting allergy     Gastroesophageal reflux disease, esophagitis presence not specified     Reviewed medications, social history and  past medical and surgical history.    Review of system: for general, respiratory, CVS, GI and psychiatry negative except for noted above.     EXAM:  /87 (BP Location: Left arm, Patient Position: Sitting, Cuff Size: Adult Large)   Pulse 64   Temp 98.1  F (36.7  C) (Oral)   Resp 16   Ht 1.829 m (6')   Wt 105.5 kg (232 lb 8 oz)   SpO2 100%   BMI 31.53 kg/m    Constitutional: healthy, alert and no distress   Psychiatric: mentation appears normal and affect normal/bright  Abdomen: Abdomen soft, non-tender. BS normal. No masses, organomegaly  : Deferred    ASSESSMENT / PLAN:  (K21.9) Gastroesophageal reflux disease, esophagitis presence not specified  (primary encounter diagnosis)  Comment: Most of his symptoms are consistent with GERD.  He has some atypical symptoms and he previously has had right upper quadrant ultrasound which was negative, he has had negative lipase, CMP,  CBC, celiac test.  He has persistent for some symptoms and I am repeating some of the test today.  I will avoid repeating ultrasound.  He does not have a typical symptom of gallbladder disease.  H. pylori was not collected previously and I am going to do that today.  He understands blood test for celiac disease is not too accurate.  We discussed about lifestyle changes and using ranitidine as he is using. GI referral given due to duration of symptoms.   Plan: H Pylori antigen, stool, Tissue         transglutaminase leila IgA and IgG,         GASTROENTEROLOGY ADULT REF CONSULT ONLY, CBC         with platelets, Comprehensive metabolic panel,         TSH with free T4 reflex          (R10.9) Stomach pain  Comment: See above.  Plan: H Pylori antigen, stool, Tissue         transglutaminase leila IgA and  IgG,         GASTROENTEROLOGY ADULT REF CONSULT ONLY, CBC         with platelets, Comprehensive metabolic panel,         TSH with free T4 reflex          (Z23) Need for prophylactic vaccination and inoculation against influenza  Comment:   Plan: FLU VACCINE, SPLIT VIRUS, IM (QUADRIVALENT)         [29114]- >3 YRS, Vaccine Administration,         Initial [17523]          Follow up: With gastroenterologist    The above note was dictated using voice recognition. Although reviewed after completion, some word and grammatical error may remain .      Patient Instructions       Patient Education     Tips to Control Acid Reflux    To control acid reflux, you ll need to make some basic diet and lifestyle changes. The simple steps outlined below may be all you ll need to ease discomfort.  Watch what you eat    Avoid fatty foods and spicy foods.    Eat fewer acidic foods, such as citrus and tomato-based foods. These can increase symptoms.    Limit drinking alcohol, caffeine, and fizzy beverages. All increase acid reflux.    Try limiting chocolate, peppermint, and spearmint. These can worsen acid reflux in some people.  Watch when you eat    Avoid lying down for 3 hours after eating.    Do not snack before going to bed.  Raise your head  Raising your head and upper body by 4 to 6 inches helps limit reflux when you re lying down. Put blocks under the head of your bed frame to raise it.  Other changes    Lose weight, if you need to    Don t exercise near bedtime    Avoid tight-fitting clothes    Limit aspirin and ibuprofen    Stop smoking   Date Last Reviewed: 7/1/2016 2000-2018 [employer]. [address] [facility], [place] [address]. All rights reserved. This information is not intended as a substitute for professional medical care. Always follow your healthcare professional's instructions.             Injectable Influenza Immunization Documentation    1.  Is the person to be vaccinated sick today?  Yes    2. Does  the person to be vaccinated have an allergy to a component   of the vaccine?   No  Egg Allergy Algorithm Link    3. Has the person to be vaccinated ever had a serious reaction   to influenza vaccine in the past?   No    4. Has the person to be vaccinated ever had Guillain-Barré syndrome?   No    Form completed by Ana Paula Rodgers CMA

## 2019-02-27 ASSESSMENT — ANXIETY QUESTIONNAIRES: GAD7 TOTAL SCORE: 4

## 2019-02-28 LAB
ALBUMIN SERPL-MCNC: 4.4 G/DL (ref 3.4–5)
ALP SERPL-CCNC: 78 U/L (ref 40–150)
ALT SERPL W P-5'-P-CCNC: 67 U/L (ref 0–70)
ANION GAP SERPL CALCULATED.3IONS-SCNC: 5 MMOL/L (ref 3–14)
AST SERPL W P-5'-P-CCNC: 58 U/L (ref 0–45)
BILIRUB SERPL-MCNC: 0.4 MG/DL (ref 0.2–1.3)
BUN SERPL-MCNC: 14 MG/DL (ref 7–30)
CALCIUM SERPL-MCNC: 9.4 MG/DL (ref 8.5–10.1)
CHLORIDE SERPL-SCNC: 104 MMOL/L (ref 94–109)
CO2 SERPL-SCNC: 28 MMOL/L (ref 20–32)
CREAT SERPL-MCNC: 0.92 MG/DL (ref 0.66–1.25)
GFR SERPL CREATININE-BSD FRML MDRD: >90 ML/MIN/{1.73_M2}
GLUCOSE SERPL-MCNC: 86 MG/DL (ref 70–99)
POTASSIUM SERPL-SCNC: 4.1 MMOL/L (ref 3.4–5.3)
PROT SERPL-MCNC: 7.9 G/DL (ref 6.8–8.8)
SODIUM SERPL-SCNC: 137 MMOL/L (ref 133–144)
TSH SERPL DL<=0.005 MIU/L-ACNC: 1.87 MU/L (ref 0.4–4)
TTG IGA SER-ACNC: 1 U/ML
TTG IGG SER-ACNC: <1 U/ML

## 2019-03-01 DIAGNOSIS — R10.9 STOMACH PAIN: ICD-10-CM

## 2019-03-01 DIAGNOSIS — K21.9 GASTROESOPHAGEAL REFLUX DISEASE, ESOPHAGITIS PRESENCE NOT SPECIFIED: ICD-10-CM

## 2019-03-01 PROCEDURE — 87338 HPYLORI STOOL AG IA: CPT | Performed by: FAMILY MEDICINE

## 2019-03-04 LAB
H PYLORI AG STL QL IA: NORMAL
SPECIMEN SOURCE: NORMAL

## 2019-03-07 ENCOUNTER — TELEPHONE (OUTPATIENT)
Dept: FAMILY MEDICINE | Facility: CLINIC | Age: 34
End: 2019-03-07

## 2019-03-07 DIAGNOSIS — R79.89 ELEVATED LFTS: Primary | ICD-10-CM

## 2019-03-07 NOTE — TELEPHONE ENCOUNTER
"Dr. Christiansen,     Patient called in regards to his AST result and instruction form lab result that states:  \"   one of your liver enzyme is slightly on higher side, if you drink any alcohol please quit it completely\"            Patient would like more of a rationale on why PCP thinks patient should completely quit drinking- Patient stated, \" it seems extreme\"    Please advise.    Thanks! Sharon Sanches RN    "

## 2019-03-08 NOTE — TELEPHONE ENCOUNTER
Per notes below, pt informed for recheck in 3-4 weeks.    Closing encounter; no further action needed.    Diane Willams RN, BSN   [facility]

## 2019-03-08 NOTE — TELEPHONE ENCOUNTER
Patient informed as below.  States he does not really use a lot of alcohol, but did have some the night beforehis labs were drawn.  Will abstain for 3 to 4 weeks and then recheck.  Please put in future labs.  Would you just want AST or AST/ALT or CMP?  Elisabeth Kidd RN

## 2019-03-08 NOTE — TELEPHONE ENCOUNTER
I generally recommend stopping alcohol completely for 3-4 weeks with abnormal liver enzymes and recheck enzymes again - if it is fine - it most likely alcohol related. Just reassure him that his liver enzyme is not really high but it would be easy to check with above method.    There are many other reasons for elevated liver enzymes and gastroenterologist can look in to it further if liver enzymes are persistently high.

## 2019-03-11 ENCOUNTER — TRANSFERRED RECORDS (OUTPATIENT)
Dept: HEALTH INFORMATION MANAGEMENT | Facility: CLINIC | Age: 34
End: 2019-03-11

## 2019-03-20 ENCOUNTER — TRANSFERRED RECORDS (OUTPATIENT)
Dept: HEALTH INFORMATION MANAGEMENT | Facility: CLINIC | Age: 34
End: 2019-03-20

## 2019-10-16 ENCOUNTER — OFFICE VISIT (OUTPATIENT)
Dept: FAMILY MEDICINE | Facility: CLINIC | Age: 34
End: 2019-10-16
Payer: COMMERCIAL

## 2019-10-16 VITALS
OXYGEN SATURATION: 97 % | RESPIRATION RATE: 18 BRPM | HEART RATE: 75 BPM | HEIGHT: 72 IN | TEMPERATURE: 98.9 F | WEIGHT: 233 LBS | SYSTOLIC BLOOD PRESSURE: 100 MMHG | BODY MASS INDEX: 31.56 KG/M2 | DIASTOLIC BLOOD PRESSURE: 70 MMHG

## 2019-10-16 DIAGNOSIS — F42.4 SKIN-PICKING DISORDER: Primary | ICD-10-CM

## 2019-10-16 DIAGNOSIS — Z23 NEED FOR PROPHYLACTIC VACCINATION AND INOCULATION AGAINST INFLUENZA: ICD-10-CM

## 2019-10-16 PROCEDURE — 90686 IIV4 VACC NO PRSV 0.5 ML IM: CPT | Performed by: FAMILY MEDICINE

## 2019-10-16 PROCEDURE — 99213 OFFICE O/P EST LOW 20 MIN: CPT | Mod: 25 | Performed by: FAMILY MEDICINE

## 2019-10-16 PROCEDURE — 90471 IMMUNIZATION ADMIN: CPT | Performed by: FAMILY MEDICINE

## 2019-10-16 RX ORDER — TRIAMCINOLONE ACETONIDE 1 MG/ML
LOTION TOPICAL 2 TIMES DAILY
Qty: 60 ML | Refills: 1 | Status: SHIPPED | OUTPATIENT
Start: 2019-10-16 | End: 2020-06-03

## 2019-10-16 ASSESSMENT — MIFFLIN-ST. JEOR: SCORE: 2044.88

## 2019-10-16 NOTE — PROGRESS NOTES
Subjective     Johan Cole is a 34 year old male who presents to clinic today for the following health issues:    HPI   Skin issue   Onset: years     Description:   Location:   Character: red  Itching (Pruritis): YES- sometimes     Progression of Symptoms:  same    Accompanying Signs & Symptoms:  Fever: no   Body aches or joint pain: no   Sore throat symptoms: no   Recent cold symptoms: no     History:   Previous similar rash: YES    Precipitating factors:   Exposure to similar rash: no   New exposures: None   Recent travel: no     Alleviating factors:  nonr    Therapies Tried and outcome: coconut oil     Both arm rash been there all of his life.   Picking his skin.   Mostly on both arm, neck and some on thigh. Without knowledge he does when driving.  Sometime with stress, anxiety.   Since puberty noticed skin picking issues.       Social History     Occupational History     Not on file   Tobacco Use     Smoking status: Never Smoker     Smokeless tobacco: Never Used   Substance and Sexual Activity     Alcohol use: Yes     Comment: 2 drinks a week      Drug use: No     Sexual activity: Yes     Partners: Female     Birth control/protection: None     Allergies   Allergen Reactions     Bees Hives     Patient Active Problem List   Diagnosis     CARDIOVASCULAR SCREENING; LDL GOAL LESS THAN 160     Bee sting allergy     Gastroesophageal reflux disease, esophagitis presence not specified     Skin-picking disorder     Reviewed medications, social history and  past medical and surgical history.    Review of system: for general, respiratory, CVS, GI and psychiatry negative except for noted above.     EXAM:  /70 (BP Location: Left arm, Patient Position: Sitting, Cuff Size: Adult Regular)   Pulse 75   Temp 98.9  F (37.2  C) (Oral)   Resp 18   Ht 1.829 m (6')   Wt 105.7 kg (233 lb)   SpO2 97%   BMI 31.60 kg/m    Constitutional: healthy, alert and no distress   Psychiatric: mentation appears normal and affect  normal/bright  Skin -both forearm multiple erythematous excoriating lesion hyperpigmented crusting.  Few lesions on arm few on chin.  Thigh examination was not carried out.    ASSESSMENT / PLAN:  (F42.4) Skin-picking disorder  (primary encounter diagnosis)  Comment: None of the results are infected but he has a wide distribution of lesions.  He certainly has underlying anxiety.  He admits that but not quite ready for any major intervention.  We discussed seeing a dermatology to rule out any other skin disorder but most likely is a skin picking disorder that requires either antidepressant or antipsychotic.  He may need to see a psychiatrist or I can try serotonin specific reuptake inhibitor and see how he does.  Discussed medications and possible side effect.  He is not excited to pursue that further.  Discussed that it is hard to cure.  Triamcinolone as needed for symptomatic care for now.  He understands it will not prevent him from changing his habit.     Plan: DERMATOLOGY REFERRAL, triamcinolone (KENALOG)         0.1 % external lotion             (Z23) Need for prophylactic vaccination and inoculation against influenza  Comment:    Plan: INFLUENZA VACCINE IM > 6 MONTHS VALENT IIV4         [83054], Vaccine Administration, Initial         [29553]                  The above note was dictated using voice recognition. Although reviewed after completion, some word and grammatical error may remain .

## 2020-01-08 ENCOUNTER — OFFICE VISIT (OUTPATIENT)
Dept: FAMILY MEDICINE | Facility: CLINIC | Age: 35
End: 2020-01-08
Payer: COMMERCIAL

## 2020-01-08 VITALS
HEART RATE: 92 BPM | RESPIRATION RATE: 16 BRPM | HEIGHT: 75 IN | DIASTOLIC BLOOD PRESSURE: 86 MMHG | SYSTOLIC BLOOD PRESSURE: 120 MMHG | WEIGHT: 232.75 LBS | BODY MASS INDEX: 28.94 KG/M2 | TEMPERATURE: 98.8 F | OXYGEN SATURATION: 99 %

## 2020-01-08 DIAGNOSIS — F42.4 SKIN-PICKING DISORDER: Primary | ICD-10-CM

## 2020-01-08 DIAGNOSIS — R07.89 LEFT-SIDED CHEST WALL PAIN: ICD-10-CM

## 2020-01-08 PROCEDURE — 99207 ZZC NO CHARGE BEHAVIORAL WARM HANDOFF: CPT | Performed by: SOCIAL WORKER

## 2020-01-08 PROCEDURE — 99214 OFFICE O/P EST MOD 30 MIN: CPT | Performed by: FAMILY MEDICINE

## 2020-01-08 RX ORDER — CITALOPRAM HYDROBROMIDE 20 MG/1
TABLET ORAL
Qty: 30 TABLET | Refills: 1 | Status: SHIPPED | OUTPATIENT
Start: 2020-01-08 | End: 2020-06-17

## 2020-01-08 ASSESSMENT — ANXIETY QUESTIONNAIRES
6. BECOMING EASILY ANNOYED OR IRRITABLE: SEVERAL DAYS
1. FEELING NERVOUS, ANXIOUS, OR ON EDGE: MORE THAN HALF THE DAYS
4. TROUBLE RELAXING: SEVERAL DAYS
GAD7 TOTAL SCORE: 6
2. NOT BEING ABLE TO STOP OR CONTROL WORRYING: SEVERAL DAYS
3. WORRYING TOO MUCH ABOUT DIFFERENT THINGS: SEVERAL DAYS
7. FEELING AFRAID AS IF SOMETHING AWFUL MIGHT HAPPEN: NOT AT ALL
GAD7 TOTAL SCORE: 6
GAD7 TOTAL SCORE: 6
5. BEING SO RESTLESS THAT IT IS HARD TO SIT STILL: NOT AT ALL
7. FEELING AFRAID AS IF SOMETHING AWFUL MIGHT HAPPEN: NOT AT ALL

## 2020-01-08 ASSESSMENT — MIFFLIN-ST. JEOR: SCORE: 2082.41

## 2020-01-08 ASSESSMENT — PATIENT HEALTH QUESTIONNAIRE - PHQ9
10. IF YOU CHECKED OFF ANY PROBLEMS, HOW DIFFICULT HAVE THESE PROBLEMS MADE IT FOR YOU TO DO YOUR WORK, TAKE CARE OF THINGS AT HOME, OR GET ALONG WITH OTHER PEOPLE: SOMEWHAT DIFFICULT
SUM OF ALL RESPONSES TO PHQ QUESTIONS 1-9: 12
SUM OF ALL RESPONSES TO PHQ QUESTIONS 1-9: 12

## 2020-01-08 NOTE — PROGRESS NOTES
Subjective     Johan Cole is a 35 year old male who presents to clinic today for the following health issues:    HPI   Follow up Derm       Duration: follow up from 10/2019    Description (location/character/radiation): skin picking on arms, thighs and beard    Intensity:  moderate    Accompanying signs and symptoms: redness, scabbing, picking at skin often, everyday     History (similar episodes/previous evaluation): None    Precipitating or alleviating factors: None    Therapies tried and outcome: Kenalog lotion helped, coconut oil     Being mindful but still cant stop.     Steroid cream helped.     Trimmed down his beard. Was picking it too.     Family, friends, girl friend all noticed and they remind him.     left chest wall - soreness for 6 weeks. Small bump.       Answers for HPI/ROS submitted by the patient on 1/8/2020   If you checked off any problems, how difficult have these problems made it for you to do your work, take care of things at home, or get along with other people?: Somewhat difficult  PHQ9 TOTAL SCORE: 12  NEERAJ 7 TOTAL SCORE: 6    Social History     Occupational History     Not on file   Tobacco Use     Smoking status: Never Smoker     Smokeless tobacco: Never Used   Substance and Sexual Activity     Alcohol use: Yes     Comment: 2 drinks a week      Drug use: No     Sexual activity: Yes     Partners: Female     Birth control/protection: None     Allergies   Allergen Reactions     Bees Hives     Patient Active Problem List   Diagnosis     CARDIOVASCULAR SCREENING; LDL GOAL LESS THAN 160     Bee sting allergy     Gastroesophageal reflux disease, esophagitis presence not specified     Skin-picking disorder     Reviewed medications, social history and  past medical and surgical history.    Review of system: for general, respiratory, CVS, GI and psychiatry negative except for noted above.     EXAM:  /86 (BP Location: Left arm, Patient Position: Sitting, Cuff Size: Adult Large)   Pulse  "92   Temp 98.8  F (37.1  C) (Oral)   Resp 16   Ht 1.899 m (6' 2.75\")   Wt 105.6 kg (232 lb 12 oz)   SpO2 99%   BMI 29.29 kg/m    Constitutional: healthy, alert and no distress   Psychiatric: Anxious but pleasant  Both are moderate elbow, forearm face, chin multiple erythematous lesions.  No any infectious lesion.  Left lateral breast -small reported tender spot.  No any palpable deformity.    ASSESSMENT / PLAN:  (F42.4) Skin-picking disorder  (primary encounter diagnosis)  Comment: We discussed treatment can be really hard.  We discussed about seeing psychiatrist.  He would like to hold off on that.  We discussed SSRIs but no long-term study to test effectiveness on skin picking disorder.  We discussed side effects.  We discussed importance of therapy for OCD's.  He was also seen by [facility] and we appreciate his input.  We discussed underlying anxiety may be the primary culprit.  He somewhat accepts that.  Plan: citalopram (CELEXA) 20 MG tablet             (R07.89) Left-sided chest wall pain  Comment:    Plan: Most likely soft tissue injury or inflammation.  Continue to monitor.  If not improving will consider an ultrasound.  Currently no signs of malignancy or abscess.         The above note was dictated using voice recognition. Although reviewed after completion, some word and grammatical error may remain .        "

## 2020-01-08 NOTE — PROGRESS NOTES
"SUBJECTIVE:   CC: Johan Cole is an 35 year old male who presents for preventative health visit.     HPI  {Add if <65 person on Medicare  - Required Questions (Optional):595768}  {Outside tests to abstract? :958240}    {additional problems to add (Optional):677588}    Today's PHQ-2 Score:   PHQ-2 ( 1999 Pfizer) 2/26/2019   Q1: Little interest or pleasure in doing things 2   Q2: Feeling down, depressed or hopeless 3   PHQ-2 Score 5   Q1: Little interest or pleasure in doing things -   Q2: Feeling down, depressed or hopeless -   PHQ-2 Score -       Abuse: Current or Past(Physical, Sexual or Emotional)- { :859191}  Do you feel safe in your environment? { :139764}        Social History     Tobacco Use     Smoking status: Never Smoker     Smokeless tobacco: Never Used   Substance Use Topics     Alcohol use: Yes     Comment: 2 drinks a week      {Rooming Staff- Complete this question if Prescreen response is not shown below for today's visit. If you drink alcohol do you typically have >3 drinks per day or >7 drinks per week? (Optional):020836}    Alcohol Use 5/8/2018   Prescreen: >3 drinks/day or >7 drinks/week? No   Prescreen: >3 drinks/day or >7 drinks/week? -   {add AUDIT responses (Optional) (A score of 7 for adult men is an indication of hazardous drinking; a score of 8 or more is an indication of an alcohol use disorder.  A score of 7 or more for adult women is an indication of hazardous drinking or an alchohol use disorder):186325}    Last PSA: No results found for: PSA    Reviewed orders with patient. Reviewed health maintenance and updated orders accordingly - { :262246::\"Yes\"}  {Chronicprobdata (optional):591286}    Reviewed and updated as needed this visit by clinical staff         Reviewed and updated as needed this visit by Provider        {HISTORY OPTIONS (Optional):215867}    Review of Systems  {MALE ROS (Optional):738697::\"CONSTITUTIONAL: NEGATIVE for fever, chills, change in " "weight\",\"INTEGUMENTARY/SKIN: NEGATIVE for worrisome rashes, moles or lesions\",\"EYES: NEGATIVE for vision changes or irritation\",\"ENT: NEGATIVE for ear, mouth and throat problems\",\"RESP: NEGATIVE for significant cough or SOB\",\"CV: NEGATIVE for chest pain, palpitations or peripheral edema\",\"GI: NEGATIVE for nausea, abdominal pain, heartburn, or change in bowel habits\",\" male: negative for dysuria, hematuria, decreased urinary stream, erectile dysfunction, urethral discharge\",\"MUSCULOSKELETAL: NEGATIVE for significant arthralgias or myalgia\",\"NEURO: NEGATIVE for weakness, dizziness or paresthesias\",\"PSYCHIATRIC: NEGATIVE for changes in mood or affect\"}    OBJECTIVE:   There were no vitals taken for this visit.    Physical Exam  {Exam Choices (Optional):000975}    {Diagnostic Test Results (Optional):198716::\"Diagnostic Test Results:\",\"Labs reviewed in Epic\"}    ASSESSMENT/PLAN:   {Diag Picklist:698800}    COUNSELING:   {MALE COUNSELING MESSAGES:946433::\"Reviewed preventive health counseling, as reflected in patient instructions\"}    Estimated body mass index is 31.6 kg/m  as calculated from the following:    Height as of 10/16/19: 1.829 m (6').    Weight as of 10/16/19: 105.7 kg (233 lb).     {Weight Management Plan (ACO) Complete if BMI is abnormal-  Ages 18-64  BMI >24.9.  Age 65+ with BMI <23 or >30 (Optional):676482}     reports that he has never smoked. He has never used smokeless tobacco.  {Tobacco Cessation -- Complete if patient is a smoker (Optional):933162}    Counseling Resources:  ATP IV Guidelines  Pooled Cohorts Equation Calculator  FRAX Risk Assessment  ICSI Preventive Guidelines  Dietary Guidelines for Americans, 2010  USDA's MyPlate  ASA Prophylaxis  Lung CA Screening    Tay Christiansen MD, MD  [facility]  "

## 2020-01-09 ASSESSMENT — ANXIETY QUESTIONNAIRES: GAD7 TOTAL SCORE: 6

## 2020-01-09 ASSESSMENT — PATIENT HEALTH QUESTIONNAIRE - PHQ9: SUM OF ALL RESPONSES TO PHQ QUESTIONS 1-9: 12

## 2020-01-09 NOTE — PROGRESS NOTES
Patient had appointment with his/her primary care physician. [facility] services were requested. Explained the role of the [facility] and provided informational handout and contact information for the [facility].     Patient has a 10-year history of picking his hair and until 2 months ago, had believed this was due to underlying medical phenomena.  Patient reports his PCP 2 months ago had talked to him about the diagnosis of trichotillomania.  Patient was more open to meet with a therapist at this time.  Patient reports the blemishes on his arms and face caused him shame and further impact his low self-esteem.  Provide education to patient about trichotillomania and different treatment options.  Provided resources of the anxiety resource center in [place] and [facility] outpatient treatment program that specializes in OCD.      Explore with patient any history of trauma.  Patient became tearful, avoid eye contact and acknowledge significant history of trauma.  Patient made a connection that his hair pulling may be his way of coping with underlying trauma.    Plan    Patient will schedule with the [facility] to have a more comprehensive evaluation to be referred to the appropriate level of care.  Patient initially presented with trichotillomania but then also alluded to significant history of trauma.

## 2020-03-02 ENCOUNTER — HEALTH MAINTENANCE LETTER (OUTPATIENT)
Age: 35
End: 2020-03-02

## 2020-03-16 ENCOUNTER — TELEPHONE (OUTPATIENT)
Dept: FAMILY MEDICINE | Facility: CLINIC | Age: 35
End: 2020-03-16

## 2020-03-16 NOTE — TELEPHONE ENCOUNTER
Called and spoke to patient, cancelled appointment for today, patient would just like a referral for imaging for his ongoing breast tissue and bump concerns.    Marnie Yepez MA on 3/16/2020 at 9:00 AM\    
It would be best if he can wait for further evaluation unless is a medical emergency due to current community outbreak of coronavirus.  If he thinks it is an emergency I would recommend he should be seen in the clinic and we can order appropriate imaging.  
No emergency, he can wait, but wanted you to know he has been having pain past 3 months, feels soreness and the 1 lump might be growing.   
Noted. If getting worse, can be seen sooner but if not urgent - wait  
DISPLAY PLAN FREE TEXT

## 2020-06-17 ENCOUNTER — VIRTUAL VISIT (OUTPATIENT)
Dept: FAMILY MEDICINE | Facility: CLINIC | Age: 35
End: 2020-06-17
Payer: COMMERCIAL

## 2020-06-17 VITALS — WEIGHT: 230 LBS | HEIGHT: 74 IN | BODY MASS INDEX: 29.52 KG/M2

## 2020-06-17 DIAGNOSIS — F42.4 SKIN-PICKING DISORDER: ICD-10-CM

## 2020-06-17 DIAGNOSIS — Z20.822 COVID-19 RULED OUT: Primary | ICD-10-CM

## 2020-06-17 PROCEDURE — 99213 OFFICE O/P EST LOW 20 MIN: CPT | Mod: 95 | Performed by: FAMILY MEDICINE

## 2020-06-17 ASSESSMENT — ANXIETY QUESTIONNAIRES
5. BEING SO RESTLESS THAT IT IS HARD TO SIT STILL: SEVERAL DAYS
6. BECOMING EASILY ANNOYED OR IRRITABLE: SEVERAL DAYS
3. WORRYING TOO MUCH ABOUT DIFFERENT THINGS: SEVERAL DAYS
1. FEELING NERVOUS, ANXIOUS, OR ON EDGE: SEVERAL DAYS
GAD7 TOTAL SCORE: 7
7. FEELING AFRAID AS IF SOMETHING AWFUL MIGHT HAPPEN: SEVERAL DAYS
GAD7 TOTAL SCORE: 7
7. FEELING AFRAID AS IF SOMETHING AWFUL MIGHT HAPPEN: SEVERAL DAYS
GAD7 TOTAL SCORE: 7
2. NOT BEING ABLE TO STOP OR CONTROL WORRYING: SEVERAL DAYS
4. TROUBLE RELAXING: SEVERAL DAYS

## 2020-06-17 ASSESSMENT — PATIENT HEALTH QUESTIONNAIRE - PHQ9
10. IF YOU CHECKED OFF ANY PROBLEMS, HOW DIFFICULT HAVE THESE PROBLEMS MADE IT FOR YOU TO DO YOUR WORK, TAKE CARE OF THINGS AT HOME, OR GET ALONG WITH OTHER PEOPLE: SOMEWHAT DIFFICULT
SUM OF ALL RESPONSES TO PHQ QUESTIONS 1-9: 9

## 2020-06-17 ASSESSMENT — MIFFLIN-ST. JEOR: SCORE: 2058.02

## 2020-06-17 NOTE — PATIENT INSTRUCTIONS
Trial of evening primrose oil capsules for a week.  If not improving - call clinic for breast center referral.    Follow up with dermatologist.

## 2020-06-17 NOTE — PROGRESS NOTES
"Johan Cole is a 35 year old male who is being evaluated via a billable video visit.      The patient has been notified of following:     \"This video visit will be conducted via a call between you and your physician/provider. We have found that certain health care needs can be provided without the need for an in-person physical exam.  This service lets us provide the care you need with a video conversation.  If a prescription is necessary we can send it directly to your pharmacy.  If lab work is needed we can place an order for that and you can then stop by our lab to have the test done at a later time.    Video visits are billed at different rates depending on your insurance coverage.  Please reach out to your insurance provider with any questions.    If during the course of the call the physician/provider feels a video visit is not appropriate, you will not be charged for this service.\"    Patient has given verbal consent for Video visit? Yes    Will anyone else be joining your video visit? No     Subjective     Johan Cole is a 35 year old male who presents today via video visit for the following health issues:    HPI  Medication Followup of triamcinolone lotion     Taking Medication as prescribed: yes    Side Effects:  None    Medication Helping Symptoms:  yes   Additional: sore tissue on left breast, has been getting worse. Questions about COVID.     Video Start Time: 3:44 PM    Depression and Anxiety Follow-Up-  Answered yes to #9 and currently not taking medication.     How are you doing with your depression since your last visit? No change    How are you doing with your anxiety since your last visit?  No change    Are you having other symptoms that might be associated with depression or anxiety? No    Have you had a significant life event? No     Do you have any concerns with your use of alcohol or other drugs? No    Social History     Tobacco Use     Smoking status: Never Smoker     Smokeless " tobacco: Never Used   Substance Use Topics     Alcohol use: Yes     Comment: 2 drinks a week      Drug use: No     PHQ 2/26/2019 1/8/2020 6/17/2020   PHQ-9 Total Score - 12 8   Q9: Thoughts of better off dead/self-harm past 2 weeks Not at all Several days Not at all   F/U: Thoughts of suicide or self-harm - No No   F/U: Safety concerns - No No     NEERAJ-7 SCORE 2/26/2019 1/8/2020 6/17/2020   Total Score - 6 (mild anxiety) 7 (mild anxiety)   Total Score 4 6 7     Last PHQ-9 6/17/2020   1.  Little interest or pleasure in doing things 1   2.  Feeling down, depressed, or hopeless 1   3.  Trouble falling or staying asleep, or sleeping too much 1   4.  Feeling tired or having little energy 1   5.  Poor appetite or overeating 1   6.  Feeling bad about yourself 1   7.  Trouble concentrating 1   8.  Moving slowly or restless 1   Q9: Thoughts of better off dead/self-harm past 2 weeks 0   PHQ-9 Total Score 8   Difficulty at work, home, or with people -   In the past two weeks have you had thoughts of suicide or self harm? No   Do you have concerns about your personal safety or the safety of others? No     NEERAJ-7  6/17/2020   1. Feeling nervous, anxious, or on edge 1   2. Not being able to stop or control worrying 1   3. Worrying too much about different things 1   4. Trouble relaxing 1   5. Being so restless that it is hard to sit still 1   6. Becoming easily annoyed or irritable 1   7. Feeling afraid, as if something awful might happen 1   NEERAJ-7 Total Score 7   If you checked any problems, how difficult have they made it for you to do your work, take care of things at home, or get along with other people? 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     Skin picking issues - sorenss of skin gets better with triamcinolone. It helps. Never used more than a week in a raw.     celexa - did not start it.     Started therapist for skin picking. Wants to see how he does  "without therapist. Also tried setting up derm appt - wait list was too long and they did not schedule it.     No suicidal thoughts. Accidentally clicked on positive suicidal thoughts.     In December - was sick and wondering antibody testing would be helpful.     Bump on breast tissue - still something in there. occasionally sore in the morning. Left breast. No discharge.    Reviewed and updated as needed this visit by Provider      Review of Systems   Constitutional, HEENT, cardiovascular, pulmonary, GI, , musculoskeletal, neuro, skin, endocrine and psych systems are negative, except as otherwise noted.      Objective    Ht 1.88 m (6' 2\")   Wt 104.3 kg (230 lb)   BMI 29.53 kg/m    Estimated body mass index is 29.53 kg/m  as calculated from the following:    Height as of this encounter: 1.88 m (6' 2\").    Weight as of this encounter: 104.3 kg (230 lb).  Physical Exam     GENERAL: Healthy, alert and no distress  EYES: Eyes grossly normal to inspection.  No discharge or erythema, or obvious scleral/conjunctival abnormalities.  RESP: No audible wheeze, cough, or visible cyanosis.  No visible retractions or increased work of breathing.    SKIN: Visible skin clear. No significant rash, abnormal pigmentation or lesions.  NEURO: Cranial nerves grossly intact.  Mentation and speech appropriate for age.  PSYCH: Mentation appears normal, affect normal/bright, judgement and insight intact, normal speech and appearance well-groomed.            Assessment & Plan   (Z03.818) COVID-19 ruled out  (primary encounter diagnosis)  Comment: Would like to have antibody testing done as he feels like he has had COVID is already antibody testing ordered.    Plan: COVID-19 Virus (Coronavirus) Antibody & Titer         Reflex             (F42.4) Skin-picking disorder  Comment: Ongoing.  Never started SSRI.  He is not excited about starting SSRI yet.  He is starting to try and wants to see how he does with the therapist.  He contacted a " dermatologist but could not get in for months and he is going to contact them again.  Using times on as needed with good benefit and I think it is appropriate to use it for now.  Plan:        He accidentally selected question #9 for PHQ 9 but he denies any concern about suicidal thoughts.  He confirmed that with me.  I also apologize as we were unable to talk to him 2 days ago due to system outage.  He understood.    Tay Christiansen MD, MD  [facility]          Video-Visit Details    Type of service:  Video Visit    Video End Time:4:02 PM    Originating Location (pt. Location): Home    Distant Location (provider location):  [facility]     Platform used for Video Visit: Roland    No follow-ups on file.       Tay Christiansen MD, MD        Answers for HPI/ROS submitted by the patient on 6/17/2020   If you checked off any problems, how difficult have these problems made it for you to do your work, take care of things at home, or get along with other people?: Somewhat difficult  PHQ9 TOTAL SCORE: 9  NEERAJ 7 TOTAL SCORE: 7

## 2020-06-18 ASSESSMENT — ANXIETY QUESTIONNAIRES: GAD7 TOTAL SCORE: 7

## 2020-06-26 DIAGNOSIS — Z20.822 COVID-19 RULED OUT: ICD-10-CM

## 2020-06-26 PROCEDURE — 99000 SPECIMEN HANDLING OFFICE-LAB: CPT | Performed by: FAMILY MEDICINE

## 2020-06-26 PROCEDURE — 36415 COLL VENOUS BLD VENIPUNCTURE: CPT | Performed by: FAMILY MEDICINE

## 2020-06-26 PROCEDURE — 86769 SARS-COV-2 COVID-19 ANTIBODY: CPT | Mod: 90 | Performed by: FAMILY MEDICINE

## 2020-06-26 NOTE — LETTER
June 28, 2020        Johan Cole  [address] [address]      COVID-19 Antibody Screen   Date Value Ref Range Status   06/26/2020 Negative  Final     Comment:     No COVID-19 antibodies detected.  Patients within 10 days of symptom onset for   COVID-19 may not produce sufficient levels of detectable antibodies.    Immunocompromised COVID-19 patients may take longer to develop antibodies.       COVID-19 Antibody, IgG Titer   Date Value Ref Range Status   06/26/2020 Not Applicable  Final     Comment:     Qualitative screen for total antibodies to COVID-19 (SARS-CoV-2) with   semi-quantitative measurement of IgG COVID-19 antibodies by endpoint titer.    COVID-19 antibodies may be elevated due to a past or current infection.  Negative results do not rule out COVID-19 infection.  Results from antibody   testing should not be used as the sole basis to diagnose or exclude SARS-CoV-2   infection or to inform infection status.  COVID-19 PCR test should be ordered   if current infection is suspected.  False positive results may occur in rare   cases due to cross-reacting antibodies.  This test was developed and its performance characteristics determined by the   [facility] ([facility]),   which is regulated under CLIA as qualified to perform high-complexity testing.    This test has not been reviewed by the FDA.  Testing performed by [facility], [facility], [address], [address], [place] [address]         You have tested NEGATIVE for COVID-19 antibodies. This suggests you have not had or been exposed to COVID-19. But it does not mean that for sure.     The test finds antibodies in most people 10 days after they get sick. For some people, it takes longer than 10 days for antibodies to show up. Others may never show antibodies against COVID-19, especially if they have weak immune  systems.    If you have COVID-19 symptoms now, please stay home and away from others.     What is antibody testing?    This is a kind of blood test. We take a small sample of your blood, and then test it for something called  antibodies.      Your body makes antibodies to fight infection. If your blood has antibodies for a certain germ, it means you ve been infected with that germ in the past.     Sometimes, antibodies stay in your body for years after you ve had the infection. They can be there even if the germ didn t make you sick. They are a sign that your body fought off the infection.    Will this test find antibodies in everyone who s had COVID-19?    No. The test finds antibodies in most people 10 days after they get sick. For some people, it takes longer than 10 days for antibodies to show up. Others may never show antibodies against COVID-19, especially if they have weak immune systems.    What does it mean if the test finds COVID-19 antibodies?    If we find these antibodies, it suggests:     This person has had the virus.     Their body s immune system fought the virus.     We don t know if this will help protect someone from getting COVID-19 again. Scientists are still learning about this.    What are the signs of COVID-19?    Signs of COVID-19 can appear from 2 to 14 days (up to 2 weeks) after you re infected. Some people have no symptoms or only mild symptoms. Others get very sick. The most common symptoms are:          Cough    Shortness of breath or trouble breathing  Or at least 2 of these symptoms:    Fever    Chills    Repeated shaking with chills    Muscle pain    Headache    Sore throat    Losing your sense of taste or smell    You may have other symptoms. Please contact your doctor or clinic for any symptoms that worry you.    Where can I get more information?     To learn the [place] s guidelines for staying home, please visit the [facility] website at  https://www.health.state.[place]/diseases/coronavirus/basics.html    To learn more about COVID-19 and how to care for yourself at home, please visit the CDC website at https://www.cdc.gov/coronavirus/2019-ncov/about/steps-when-sick.html    For more options for care at [facility], please visit our website at [email or web address]    [place] [facility] ([facility]) COVID-19 Hotline:  [phone]

## 2020-06-27 LAB
COVID-19 SPIKE RBD ABY TITER: NORMAL
COVID-19 SPIKE RBD ABY: NEGATIVE

## 2020-10-08 ENCOUNTER — OFFICE VISIT (OUTPATIENT)
Dept: DERMATOLOGY | Facility: CLINIC | Age: 35
End: 2020-10-08
Payer: COMMERCIAL

## 2020-10-08 DIAGNOSIS — F42.4 SKIN-PICKING DISORDER: ICD-10-CM

## 2020-10-08 DIAGNOSIS — L28.1 PRURIGO NODULARIS: Primary | ICD-10-CM

## 2020-10-08 DIAGNOSIS — L73.9 FOLLICULITIS: ICD-10-CM

## 2020-10-08 PROCEDURE — 99203 OFFICE O/P NEW LOW 30 MIN: CPT | Performed by: DERMATOLOGY

## 2020-10-08 RX ORDER — DOXYCYCLINE 100 MG/1
100 CAPSULE ORAL 2 TIMES DAILY
Qty: 60 CAPSULE | Refills: 2 | Status: SHIPPED | OUTPATIENT
Start: 2020-10-08 | End: 2020-11-07

## 2020-10-08 ASSESSMENT — PAIN SCALES - GENERAL: PAINLEVEL: NO PAIN (0)

## 2020-10-08 NOTE — NURSING NOTE
Dermatology Rooming Note    Johan Cole's goals for this visit include:   Chief Complaint   Patient presents with     Derm Problem     Edgarsin is here for concerns of picking at his skin.      Renae Coyne LPN

## 2020-10-08 NOTE — PROGRESS NOTES
"[facility] Dermatology Note      Dermatology Problem List:  1.Prurigo nodularis in setting of acne/folliculitis and picking disorder.   - doxycycline 100 mg PO BID; dilute bleach baths  - N-acetylcysteine 600 mg PO BID  - triam 0.1% lotion BID PRN    CC:   Chief Complaint   Patient presents with     Derm Problem     Johan is here for concerns of picking at his skin.      Encounter Date: Oct 8, 2020    History of Present Illness:  Mr. Johan Cole is a 35 year old male who presents for evaluation of rash on his upper and lower extremities, trunk area. He states this has been ongoing issue for a few years. Does have a history of acne on the face since teenage years, but over the last few years has developed a papular rash on his upper extremities > lower extremities > trunk. He states lesions start as \"pimple-like\" spots and he does endorse picking at them. He sees a psychologist for reducing these behaviors and is ongoing a form a of habit reversal training. In terms of treatment, he has tried triamcinolone 0.1% lotion from his PCP though he states he felt this made things worse. Health otherwise stable. No other skin concerns.     Past Medical History:   Patient Active Problem List   Diagnosis     CARDIOVASCULAR SCREENING; LDL GOAL LESS THAN 160     Bee sting allergy     Gastroesophageal reflux disease, esophagitis presence not specified     Skin-picking disorder     History reviewed. No pertinent past medical history.  History reviewed. No pertinent surgical history.    Social History:  Patient reports that he has never smoked. He has never used smokeless tobacco. He reports current alcohol use. He reports that he does not use drugs.    Family History:  Family History   Problem Relation Age of Onset     Melanoma No family hx of      Skin Cancer No family hx of        Medications:  Current Outpatient Medications   Medication Sig Dispense Refill     RaNITidine HCl (ZANTAC PO) Take 150 mg " by mouth At Bedtime       triamcinolone (KENALOG) 0.1 % external lotion Apply topically 2 times daily For up to 30 days. 60 mL 0     Allergies   Allergen Reactions     Bees Hives     Review of Systems:  -As per HPI  -Constitutional: Otherwise feeling well today, in usual state of health.  -HEENT: Patient denies nonhealing oral sores.  -Skin: As above in HPI. No additional skin concerns.    Physical exam:  Vitals: There were no vitals taken for this visit.  GEN: This is a well developed, well-nourished male in no acute distress, in a pleasant mood.    SKIN: Waist-up skin, which includes the head/face, neck, both arms, chest, back, abdomen, digits and/or nails was examined.  -Roman skin type: I  -Numerous excoriated and crusted/scaly papules on bilateral upper extremities, with few similar lesions scattered on upper back and chest. There are admixed follicular based papules and pustules.  -No other lesions of concern on areas examined.     Impression/Plan:    1. Prurigo nodularis in setting of acne/folliculitis and picking disorder. Etiology discussed. Discussed treatment is generally focused on reducing precipitating events for picking (acne/folliculitis) as well as efforts to reduce picking behaviors and symptomatic relief for active areas.   - Start doxyxycline 100 mg PO BID x 3 months. Side effects of GI upset, diarrhea, nausea, photosensitivity discussed.   - Start dilute bleach baths at least 1-2 times weekly  - Start N-acetylcysteine 600 mg PO BID to reduce urge to scratch. Discussed this is available over-the-counter.   - Asked patient to trim nails to avoid trauma to skin from scratching.   - Can use triam 0.1% lotion BID PRN for active itchy areas as spot treatment  - Continue to follow with psychology for habit reversal therapy    Follow-up in 3 months, earlier for new or changing lesions.     Staff Involved:  Staff Only    Ha Childress MD  Pronouns: he/him/his    Department of  Dermatology  [facility]: Phone: [phone], Fax:[phone]  [facility]: Phone: [phone] Fax: [phone]

## 2020-10-08 NOTE — PATIENT INSTRUCTIONS
1. Start doxycycline 100 mg twice daily for 3 months.   2. Start dilute bleach soaks once or twice weekly   - Fill bath tub to the top   - Add 1/2 cup of clorox bleach (regular clorox, no scented or splashless bleach)   - Soak for 15 minutes   - Pat dry and moisturize afterward.   3. Start N-acetylcysteine 600 mg (1 tablet) twice daily. This is available over-the-counter.   4. Can use triam 0.1% lotion twice daily as needed for new, itchy spots.   5. Can use Vanicream or Vanicream Lite lotion as moisturizer.

## 2020-10-08 NOTE — LETTER
"10/8/2020       RE: Johan Cole  [address] [address]     Dear Colleague,    Thank you for referring your patient, Johan Cole, to the [facility] DERMATOLOGY CLINIC [place] at [facility]. Please see a copy of my visit note below.    [facility] Dermatology Note      Dermatology Problem List:  1.Prurigo nodularis in setting of acne/folliculitis and picking disorder.   - doxycycline 100 mg PO BID; dilute bleach baths  - N-acetylcysteine 600 mg PO BID  - triam 0.1% lotion BID PRN    CC:   Chief Complaint   Patient presents with     Derm Problem     Johan is here for concerns of picking at his skin.      Encounter Date: Oct 8, 2020    History of Present Illness:  Mr. Johan Cole is a 35 year old male who presents for evaluation of rash on his upper and lower extremities, trunk area. He states this has been ongoing issue for a few years. Does have a history of acne on the face since teenage years, but over the last few years has developed a papular rash on his upper extremities > lower extremities > trunk. He states lesions start as \"pimple-like\" spots and he does endorse picking at them. He sees a psychologist for reducing these behaviors and is ongoing a form a of habit reversal training. In terms of treatment, he has tried triamcinolone 0.1% lotion from his PCP though he states he felt this made things worse. Health otherwise stable. No other skin concerns.     Past Medical History:   Patient Active Problem List   Diagnosis     CARDIOVASCULAR SCREENING; LDL GOAL LESS THAN 160     Bee sting allergy     Gastroesophageal reflux disease, esophagitis presence not specified     Skin-picking disorder     History reviewed. No pertinent past medical history.  History reviewed. No pertinent surgical history.    Social History:  Patient reports that he has never smoked. He has never used smokeless tobacco. He reports " current alcohol use. He reports that he does not use drugs.    Family History:  Family History   Problem Relation Age of Onset     Melanoma No family hx of      Skin Cancer No family hx of        Medications:  Current Outpatient Medications   Medication Sig Dispense Refill     RaNITidine HCl (ZANTAC PO) Take 150 mg by mouth At Bedtime       triamcinolone (KENALOG) 0.1 % external lotion Apply topically 2 times daily For up to 30 days. 60 mL 0     Allergies   Allergen Reactions     Bees Hives     Review of Systems:  -As per HPI  -Constitutional: Otherwise feeling well today, in usual state of health.  -HEENT: Patient denies nonhealing oral sores.  -Skin: As above in HPI. No additional skin concerns.    Physical exam:  Vitals: There were no vitals taken for this visit.  GEN: This is a well developed, well-nourished male in no acute distress, in a pleasant mood.    SKIN: Waist-up skin, which includes the head/face, neck, both arms, chest, back, abdomen, digits and/or nails was examined.  -Roman skin type: I  -Numerous excoriated and crusted/scaly papules on bilateral upper extremities, with few similar lesions scattered on upper back and chest. There are admixed follicular based papules and pustules.  -No other lesions of concern on areas examined.     Impression/Plan:    1. Prurigo nodularis in setting of acne/folliculitis and picking disorder. Etiology discussed. Discussed treatment is generally focused on reducing precipitating events for picking (acne/folliculitis) as well as efforts to reduce picking behaviors and symptomatic relief for active areas.   - Start doxyxycline 100 mg PO BID x 3 months. Side effects of GI upset, diarrhea, nausea, photosensitivity discussed.   - Start dilute bleach baths at least 1-2 times weekly  - Start N-acetylcysteine 600 mg PO BID to reduce urge to scratch. Discussed this is available over-the-counter.   - Asked patient to trim nails to avoid trauma to skin from scratching.   -  Can use triam 0.1% lotion BID PRN for active itchy areas as spot treatment  - Continue to follow with psychology for habit reversal therapy    Follow-up in 3 months, earlier for new or changing lesions.     Staff Involved:  Staff Only    Ha Childress MD  Pronouns: he/him/his    Department of Dermatology  [facility]: Phone: [phone], Fax:[phone]  [facility]: Phone: [phone] Fax: [phone]

## 2020-11-07 ENCOUNTER — MYC REFILL (OUTPATIENT)
Dept: DERMATOLOGY | Facility: CLINIC | Age: 35
End: 2020-11-07

## 2020-11-07 DIAGNOSIS — L28.1 PRURIGO NODULARIS: ICD-10-CM

## 2020-11-07 DIAGNOSIS — L73.9 FOLLICULITIS: ICD-10-CM

## 2020-11-09 RX ORDER — DOXYCYCLINE 100 MG/1
100 CAPSULE ORAL 2 TIMES DAILY
Qty: 60 CAPSULE | Refills: 1 | Status: SHIPPED | OUTPATIENT
Start: 2020-11-09 | End: 2022-02-15

## 2020-12-20 ENCOUNTER — HEALTH MAINTENANCE LETTER (OUTPATIENT)
Age: 35
End: 2020-12-20

## 2021-01-07 ENCOUNTER — OFFICE VISIT (OUTPATIENT)
Dept: DERMATOLOGY | Facility: CLINIC | Age: 36
End: 2021-01-07
Payer: COMMERCIAL

## 2021-01-07 DIAGNOSIS — L70.0 ACNE VULGARIS: Primary | ICD-10-CM

## 2021-01-07 PROCEDURE — 99213 OFFICE O/P EST LOW 20 MIN: CPT | Performed by: DERMATOLOGY

## 2021-01-07 RX ORDER — DOXYCYCLINE 100 MG/1
100 CAPSULE ORAL DAILY
Qty: 30 CAPSULE | Refills: 2 | Status: SHIPPED | OUTPATIENT
Start: 2021-01-07 | End: 2022-02-15

## 2021-01-07 ASSESSMENT — PAIN SCALES - GENERAL: PAINLEVEL: NO PAIN (0)

## 2021-01-07 NOTE — LETTER
1/7/2021       RE: Johan Cole  [address] [address]     Dear Colleague,    Thank you for referring your patient, Johan Cole, to the [facility] DERMATOLOGY CLINIC [place] at [facility]. Please see a copy of my visit note below.    [facility] Dermatology Note   Office Visit    Encounter Date: Jan 7, 2021    Dermatology Problem List:  1. Prurigo nodularis in setting of acne/folliculitis and picking disorder.   - doxycycline 100 mg PO qdaily; BP wash  - N-acetylcysteine 600 mg PO BID  ___________________________________________    ASSESSMENT/PLAN:    1. Prurigo nodularis in setting of acne/folliculitis and picking disorder. Improved from prior. Recommended decreasing doxycycline dose with plan to discontinue at next follow-up. Will have patient start BP wash in the shower daily as additional adjuvant topical therapy. Did recommend he re-establish regular care with his psychologist for habit reversal training.    - Reduce doxycycline from 100 mg PO BID to 100 mg PO qdaily  - Continue N-acetylcysteine 1200 mg PO qdaily  - Start over-the-counter benzoyl peroxide 4-5% wash daily in the shower  - Asked patient to trim nails to avoid trauma to skin from scratching.   - Continue to follow with psychology for habit reversal therapy    Procedures Performed:   None    Follow-up: 3 month(s) in-person, earlier for new or changing lesions     CC Ha Childress MD  [address] [address] on close of this encounter.    Staff:     Ha Childress MD  Pronouns: he/him/his    Department of Dermatology  [facility]: Phone: [phone], Fax:[phone]  [facility]: Phone: [phone] Fax: [phone]    ___________________________________________      CC: Derm  Problem (Johan, is here for a follow up on his skin picking. )    HPI:  Mr. Johan Cole is a(n) 35 year old male who presents as follow-up for prurigo nodularis in setting of acne/folliculitis. Last seen on 10/8/2020 when started on doxycycline 100 mg PO BID, dilute bleach baths, N-acetylcysteine 600 mg PO BID, and triam 0.1% lotion BID PRN active areas. Today, he reports things are improved significantly from prior. Still does get occasionally new spot on the forearms. He has been taking doxycycline 100 mg PO BID and N-acetylcysteine 600 mg PO BID but can be inconsistent with dosing. Has not started bleach baths. He also has not been seeing his therapist regularly for behavioral reversal training.    Patient is otherwise feeling well, without additional concerns.    Labs/Imaging:  Labs reviewed: N/A    Physical exam:  Vitals: There were no vitals taken for this visit.  SKIN: Waist-up skin, which includes the head/face, neck, both arms, chest, back, abdomen, digits and/or nails was examined.  - Pink to hyperpigmented macules on bilateral upper extremities, few on face, c/w scarring. No significant acneiform papules appreciated on exam today.  - No other lesions of concern on areas examined.     Sincerely,    Ha Childress MD

## 2021-01-07 NOTE — PROGRESS NOTES
[facility] Dermatology Note   Office Visit    Encounter Date: Jan 7, 2021    Dermatology Problem List:  1. Prurigo nodularis in setting of acne/folliculitis and picking disorder.   - doxycycline 100 mg PO qdaily; BP wash  - N-acetylcysteine 600 mg PO BID  ___________________________________________    ASSESSMENT/PLAN:    1. Prurigo nodularis in setting of acne/folliculitis and picking disorder. Improved from prior. Recommended decreasing doxycycline dose with plan to discontinue at next follow-up. Will have patient start BP wash in the shower daily as additional adjuvant topical therapy. Did recommend he re-establish regular care with his psychologist for habit reversal training.    - Reduce doxycycline from 100 mg PO BID to 100 mg PO qdaily  - Continue N-acetylcysteine 1200 mg PO qdaily  - Start over-the-counter benzoyl peroxide 4-5% wash daily in the shower  - Asked patient to trim nails to avoid trauma to skin from scratching.   - Continue to follow with psychology for habit reversal therapy    Procedures Performed:   None    Follow-up: 3 month(s) in-person, earlier for new or changing lesions     [place] [address] on close of this encounter.    Staff:     Ha Chlidress MD  Pronouns: he/him/his    Department of Dermatology  [facility]: Phone: [phone], Fax:[phone]  [facility]: Phone: [phone] Fax: [phone]    ___________________________________________      CC: Derm Problem (Johan, is here for a follow up on his skin picking. )    HPI:  Mr. Johan Cole is a(n) 35 year old male who presents as follow-up for prurigo nodularis in setting of acne/folliculitis. Last seen on 10/8/2020 when started on doxycycline 100 mg PO BID, dilute bleach baths, N-acetylcysteine 600 mg PO  BID, and triam 0.1% lotion BID PRN active areas. Today, he reports things are improved significantly from prior. Still does get occasionally new spot on the forearms. He has been taking doxycycline 100 mg PO BID and N-acetylcysteine 600 mg PO BID but can be inconsistent with dosing. Has not started bleach baths. He also has not been seeing his therapist regularly for behavioral reversal training.    Patient is otherwise feeling well, without additional concerns.    Labs/Imaging:  Labs reviewed: N/A    Physical exam:  Vitals: There were no vitals taken for this visit.  SKIN: Waist-up skin, which includes the head/face, neck, both arms, chest, back, abdomen, digits and/or nails was examined.  - Pink to hyperpigmented macules on bilateral upper extremities, few on face, c/w scarring. No significant acneiform papules appreciated on exam today.  - No other lesions of concern on areas examined.

## 2021-01-07 NOTE — PATIENT INSTRUCTIONS
1. Start taking doxycycline 100 mg once daily in the morning only.   2. Start N-acetylcysteine 1200 mg (2 tablets of 600 mg) once daily in the morning only.   3. Recommend over-the-counter benzoyl peroxide wash 4 or 5%. Good brands are PanOxyl or Differin Cleanser.   4. Restart behavioral therapy.

## 2021-01-07 NOTE — NURSING NOTE
Chief Complaint   Patient presents with     Derm Problem     Johan, is here for a follow up on his skin picking.      Juan Delgado EMT

## 2021-03-09 DIAGNOSIS — L28.1 PRURIGO NODULARIS: ICD-10-CM

## 2021-03-09 DIAGNOSIS — L70.0 ACNE VULGARIS: ICD-10-CM

## 2021-03-09 DIAGNOSIS — L73.9 FOLLICULITIS: ICD-10-CM

## 2021-03-10 RX ORDER — DOXYCYCLINE 100 MG/1
100 CAPSULE ORAL 2 TIMES DAILY
Qty: 60 CAPSULE | Refills: 1 | OUTPATIENT
Start: 2021-03-10

## 2021-03-10 NOTE — TELEPHONE ENCOUNTER
doxycycline monohydrate (MONODOX) 100 MG capsuleTake 1 capsule (100 mg) by mouth 2 times daily   :  Last Written Prescription Date:  11/9/20  Last Fill Quantity: 60,   # refills: 1  Last Office Visit : 1/7/2021   Reduce doxycycline from 100 mg PO BID to 100 mg PO qdaily  Future Office visit:  4/13/2021    Routing refill request to provider for review/approval because:  Denied, dose adjustment and new prescription sent  Current prescription:sent to [facility] [id number]  doxycycline monohydrate (MONODOX) 100 MG capsule 30 capsule 2 1/7/2021  --   Sig - Route: Take 1 capsule (100 mg) by mouth daily - Oral

## 2021-04-18 ENCOUNTER — HEALTH MAINTENANCE LETTER (OUTPATIENT)
Age: 36
End: 2021-04-18

## 2021-04-20 ENCOUNTER — OFFICE VISIT (OUTPATIENT)
Dept: FAMILY MEDICINE | Facility: CLINIC | Age: 36
End: 2021-04-20
Payer: COMMERCIAL

## 2021-04-20 VITALS
DIASTOLIC BLOOD PRESSURE: 84 MMHG | WEIGHT: 240 LBS | OXYGEN SATURATION: 99 % | TEMPERATURE: 98.4 F | HEART RATE: 74 BPM | BODY MASS INDEX: 29.84 KG/M2 | SYSTOLIC BLOOD PRESSURE: 110 MMHG | HEIGHT: 75 IN | RESPIRATION RATE: 16 BRPM

## 2021-04-20 DIAGNOSIS — Z00.00 ROUTINE GENERAL MEDICAL EXAMINATION AT A HEALTH CARE FACILITY: Primary | ICD-10-CM

## 2021-04-20 DIAGNOSIS — Z13.1 SCREENING FOR DIABETES MELLITUS: ICD-10-CM

## 2021-04-20 DIAGNOSIS — M79.89 LEG SWELLING: ICD-10-CM

## 2021-04-20 DIAGNOSIS — R07.9 CHEST PAIN, UNSPECIFIED TYPE: ICD-10-CM

## 2021-04-20 DIAGNOSIS — Z13.6 SCREENING FOR CARDIOVASCULAR CONDITION: ICD-10-CM

## 2021-04-20 LAB
ALBUMIN SERPL-MCNC: 3.9 G/DL (ref 3.4–5)
ALP SERPL-CCNC: 80 U/L (ref 40–150)
ALT SERPL W P-5'-P-CCNC: 36 U/L (ref 0–70)
ANION GAP SERPL CALCULATED.3IONS-SCNC: 7 MMOL/L (ref 3–14)
AST SERPL W P-5'-P-CCNC: 21 U/L (ref 0–45)
BILIRUB SERPL-MCNC: 0.3 MG/DL (ref 0.2–1.3)
BUN SERPL-MCNC: 13 MG/DL (ref 7–30)
CALCIUM SERPL-MCNC: 8.8 MG/DL (ref 8.5–10.1)
CHLORIDE SERPL-SCNC: 107 MMOL/L (ref 94–109)
CHOLEST SERPL-MCNC: 192 MG/DL
CO2 SERPL-SCNC: 27 MMOL/L (ref 20–32)
CREAT SERPL-MCNC: 1.02 MG/DL (ref 0.66–1.25)
GFR SERPL CREATININE-BSD FRML MDRD: >90 ML/MIN/{1.73_M2}
GLUCOSE SERPL-MCNC: 90 MG/DL (ref 70–99)
HDLC SERPL-MCNC: 32 MG/DL
LDLC SERPL CALC-MCNC: ABNORMAL MG/DL
NONHDLC SERPL-MCNC: 160 MG/DL
POTASSIUM SERPL-SCNC: 3.9 MMOL/L (ref 3.4–5.3)
PROT SERPL-MCNC: 7.7 G/DL (ref 6.8–8.8)
SODIUM SERPL-SCNC: 141 MMOL/L (ref 133–144)
TRIGL SERPL-MCNC: 421 MG/DL

## 2021-04-20 PROCEDURE — 36415 COLL VENOUS BLD VENIPUNCTURE: CPT | Performed by: FAMILY MEDICINE

## 2021-04-20 PROCEDURE — 93000 ELECTROCARDIOGRAM COMPLETE: CPT | Performed by: FAMILY MEDICINE

## 2021-04-20 PROCEDURE — 99395 PREV VISIT EST AGE 18-39: CPT | Performed by: FAMILY MEDICINE

## 2021-04-20 PROCEDURE — 99213 OFFICE O/P EST LOW 20 MIN: CPT | Mod: 25 | Performed by: FAMILY MEDICINE

## 2021-04-20 PROCEDURE — 80061 LIPID PANEL: CPT | Performed by: FAMILY MEDICINE

## 2021-04-20 PROCEDURE — 83721 ASSAY OF BLOOD LIPOPROTEIN: CPT | Performed by: FAMILY MEDICINE

## 2021-04-20 PROCEDURE — 80053 COMPREHEN METABOLIC PANEL: CPT | Performed by: FAMILY MEDICINE

## 2021-04-20 ASSESSMENT — ENCOUNTER SYMPTOMS
NAUSEA: 0
JOINT SWELLING: 0
CHILLS: 0
HEMATURIA: 0
SORE THROAT: 0
DYSURIA: 0
HEADACHES: 0
FEVER: 0
FREQUENCY: 0
ABDOMINAL PAIN: 0
WEAKNESS: 0
SHORTNESS OF BREATH: 0
DIARRHEA: 0
HEARTBURN: 0
EYE PAIN: 0
PALPITATIONS: 0
NERVOUS/ANXIOUS: 0
ARTHRALGIAS: 0
DIZZINESS: 0
PARESTHESIAS: 0
MYALGIAS: 0
COUGH: 0
HEMATOCHEZIA: 1
CONSTIPATION: 0

## 2021-04-20 ASSESSMENT — ANXIETY QUESTIONNAIRES
GAD7 TOTAL SCORE: 6
7. FEELING AFRAID AS IF SOMETHING AWFUL MIGHT HAPPEN: SEVERAL DAYS
4. TROUBLE RELAXING: SEVERAL DAYS
1. FEELING NERVOUS, ANXIOUS, OR ON EDGE: SEVERAL DAYS
3. WORRYING TOO MUCH ABOUT DIFFERENT THINGS: SEVERAL DAYS
2. NOT BEING ABLE TO STOP OR CONTROL WORRYING: SEVERAL DAYS
5. BEING SO RESTLESS THAT IT IS HARD TO SIT STILL: NOT AT ALL
GAD7 TOTAL SCORE: 6
7. FEELING AFRAID AS IF SOMETHING AWFUL MIGHT HAPPEN: SEVERAL DAYS
GAD7 TOTAL SCORE: 6
6. BECOMING EASILY ANNOYED OR IRRITABLE: SEVERAL DAYS

## 2021-04-20 ASSESSMENT — PATIENT HEALTH QUESTIONNAIRE - PHQ9
SUM OF ALL RESPONSES TO PHQ QUESTIONS 1-9: 8
SUM OF ALL RESPONSES TO PHQ QUESTIONS 1-9: 8

## 2021-04-20 ASSESSMENT — MIFFLIN-ST. JEOR: SCORE: 2114.26

## 2021-04-20 NOTE — PROGRESS NOTES
SUBJECTIVE:   CC: Johan Cole is an 36 year old male who presents for preventative health visit.        Patient has been advised of split billing requirements and indicates understanding: Yes  Healthy Habits:     Getting at least 3 servings of Calcium per day:  Yes    Bi-annual eye exam:  Yes    Dental care twice a year:  Yes    Sleep apnea or symptoms of sleep apnea:  Daytime drowsiness    Diet:  Regular (no restrictions)    Frequency of exercise:  2-3 days/week    Duration of exercise:  45-60 minutes    Taking medications regularly:  Yes    Medication side effects:  None    PHQ-2 Total Score: 2    Additional concerns today:  Yes (soreness on left side of neck, feels tight, after getting covid vaccine last Monday April 12. )    Today's PHQ-2 Score:   PHQ-2 ( 1999 Pf[employer]) 4/20/2021   Q1: Little interest or pleasure in doing things 1   Q2: Feeling down, depressed or hopeless 1   PHQ-2 Score 2   Q1: Little interest or pleasure in doing things Several days   Q2: Feeling down, depressed or hopeless Several days   PHQ-2 Score 2       Abuse: Current or Past(Physical, Sexual or Emotional)- No  Do you feel safe in your environment? Yes    Have you ever done Advance Care Planning? (For example, a Health Directive, POLST, or a discussion with a medical provider or your loved ones about your wishes): No, advance care planning information given to patient to review.  Advanced care planning was discussed at today's visit.    Social History     Tobacco Use     Smoking status: Never Smoker     Smokeless tobacco: Never Used   Substance Use Topics     Alcohol use: Yes     Comment: 2 drinks a week      If you drink alcohol do you typically have >3 drinks per day or >7 drinks per week? No    Alcohol Use 4/20/2021   Prescreen: >3 drinks/day or >7 drinks/week? No   Prescreen: >3 drinks/day or >7 drinks/week? -   No flowsheet data found.    Last PSA: No results found for: PSA    Reviewed orders with patient. Reviewed health  "maintenance and updated orders accordingly - Yes       Reviewed and updated as needed this visit by clinical staff  Tobacco  Allergies  Meds   Med Hx  Surg Hx  Fam Hx  Soc Hx      Reviewed and updated as needed this visit by Provider    Gained weight and working on it.    Some chest pain couple of weeks ago. Once. Started around 10 am and persisted for entire day for multiple hours and it went away. Was eating not so healthy food for a while. Started eating better. Acid reflux is getting better. Some anxiety. Seeing therapist.     Since yesterday left side of neck some sore spot.     Leg swelling. Both leg. That is not new.     Got J&J vaccine 8 days ago.     Trying not to pick his skin as much. Seeing derm.     Review of Systems   Constitutional: Negative for chills and fever.   HENT: Negative for congestion, ear pain, hearing loss and sore throat.    Eyes: Negative for pain and visual disturbance.   Respiratory: Negative for cough and shortness of breath.    Cardiovascular: Positive for peripheral edema. Negative for chest pain and palpitations.   Gastrointestinal: Positive for hematochezia. Negative for abdominal pain, constipation, diarrhea, heartburn and nausea.   Genitourinary: Negative for discharge, dysuria, frequency, genital sores, hematuria, impotence and urgency.   Musculoskeletal: Negative for arthralgias, joint swelling and myalgias.   Skin: Negative for rash.   Neurological: Negative for dizziness, weakness, headaches and paresthesias.   Psychiatric/Behavioral: Negative for mood changes. The patient is not nervous/anxious.      OBJECTIVE:   /84 (BP Location: Left arm, Patient Position: Sitting, Cuff Size: Adult Large)   Pulse 74   Temp 98.4  F (36.9  C) (Oral)   Resp 16   Ht 1.905 m (6' 3\")   Wt 108.9 kg (240 lb)   SpO2 99%   BMI 30.00 kg/m      Physical Exam  GENERAL: healthy, alert and no distress  EYES: Eyes grossly normal to inspection, PERRL and conjunctivae and sclerae " normal  HENT: ear canals and TM's normal, nose and mouth without ulcers or lesions  NECK: no adenopathy, no asymmetry, masses, or scars and thyroid normal to palpation  RESP: lungs clear to auscultation - no rales, rhonchi or wheezes  CV: regular rate and rhythm, normal S1 S2, no S3 or S4, no murmur, click or rub, no peripheral edema and peripheral pulses strong  ABDOMEN: soft, nontender, no hepatosplenomegaly, no masses and bowel sounds normal   (male): normal male genitalia without lesions or urethral discharge, no hernia  MS: no gross musculoskeletal defects noted, no edema  SKIN: no suspicious lesions or rashes  NEURO: Normal strength and tone, mentation intact and speech normal  PSYCH: mentation appears normal, affect normal/bright       ASSESSMENT/PLAN:   1. Routine general medical examination at a health care facility     - LDL cholesterol direct    2. Chest pain, unspecified type  Less likely cardiac etiology.  Could be costochondritis versus GERD versus anxiety.  EKG negative.  - EKG 12-lead complete w/read - Clinics    3. Leg swelling  Due to leg swelling and chest pain we agreed to obtain an EKG today.  Exam is benign and we will rule out renal or hepatic pathology but most likely it is a dependent edema.  His job requires him to stand for too long.  Discussed compression socks, salt reduction and elevation when possible.  - Comprehensive metabolic panel (BMP + Alb, Alk Phos, ALT, AST, Total. Bili, TP)    4. Screening for diabetes mellitus       5. Screening for cardiovascular condition     - Lipid panel reflex to direct LDL Non-fasting    Patient has been advised of split billing requirements and indicates understanding: Yes  COUNSELING:   Reviewed preventive health counseling, as reflected in patient instructions  Special attention given to:        Regular exercise       Healthy diet/nutrition       Vision screening       Hearing screening    Estimated body mass index is 30 kg/m  as calculated from  "the following:    Height as of this encounter: 1.905 m (6' 3\").    Weight as of this encounter: 108.9 kg (240 lb).     Weight management plan: Discussed healthy diet and exercise guidelines    He reports that he has never smoked. He has never used smokeless tobacco.      Counseling Resources:  ATP IV Guidelines  Pooled Cohorts Equation Calculator  FRAX Risk Assessment  ICSI Preventive Guidelines  Dietary Guidelines for Americans, 2010  USDA's MyPlate  ASA Prophylaxis  Lung CA Screening    Tay Christiansen MD  [facility]  Answers for HPI/ROS submitted by the patient on 4/20/2021   Annual Exam:  PHQ9 TOTAL SCORE: 8  NEERAJ 7 TOTAL SCORE: 6    "

## 2021-04-21 LAB — LDLC SERPL DIRECT ASSAY-MCNC: 118 MG/DL

## 2021-04-21 ASSESSMENT — PATIENT HEALTH QUESTIONNAIRE - PHQ9: SUM OF ALL RESPONSES TO PHQ QUESTIONS 1-9: 8

## 2021-04-21 ASSESSMENT — ANXIETY QUESTIONNAIRES: GAD7 TOTAL SCORE: 6

## 2021-08-24 NOTE — PROGRESS NOTES
ROLANDO is a 36 year old who is being evaluated via a billable video visit.      How would you like to obtain your AVS? MyChart  If the video visit is dropped, the invitation should be resent by: Text to cell phone: [phone]  Will anyone else be joining your video visit? No      Video Start Time: 3:02 PM    Assessment & Plan     Inattention  He has underlying anxiety.  He has many symptoms of ADHD.  We discussed that his formal diagnosis of ADHD before considering any medication.  We reviewed briefly discussed about medications and our clinic protocol.  He understood.  He should follow-up with me after he has had his evaluation completed.  He understood.  He also understood ADHD evaluation visits takes time.  - MENTAL HEALTH REFERRAL  - Adult; Assessments and Testing; ADHD; MH - [facility] [phone]; We will contact you to schedule the appointment or please call with any questions; Future                 No follow-ups on file.    Tay Christiansen MD, MD  [facility]    Javier MARSHALL is a 36 year old who presents for the following health issues     History of Present Illness       Mental Health Follow-up:  Patient presents to follow-up on Depression.Patient's depression since last visit has been:  Good  The patient is not having other symptoms associated with depression.      Any significant life events: relationship concerns  Patient is not feeling anxious or having panic attacks.  Patient has no concerns about alcohol or drug use.     Social History  Tobacco Use    Smoking status: Never Smoker    Smokeless tobacco: Never Used  Alcohol use: Yes    Comment: 2 drinks a week   Drug use: No      Today's PHQ-9         PHQ-9 Total Score:     (P) 6   PHQ-9 Q9 Thoughts of better off dead/self-harm past 2 weeks :   (P) Not at all   Thoughts of suicide or self harm:      Self-harm Plan:        Self-harm Action:          Safety concerns for self or others:              Answers for HPI/ROS submitted by the patient on 8/25/2021  If you checked off any problems, how difficult have these problems made it for you to do your work, take care of things at home, or get along with other people?: Somewhat difficult  PHQ9 TOTAL SCORE: 6  NEERAJ 7 TOTAL SCORE: 5     possible adhd per patient     ADHD    Onset: a couple of year(s) ago     Description:   Easily distracted: YES  Short attention span: YES- in conversations   Trouble following directions: YES- slightly    Impulsive behavior: YES   Trouble completing tasks: YES    Accompanying Signs & Symptoms:        Change in sleep pattern: sleeping more   Irritability at certain times of the day: YES  Socially withdrawn: YES  Depression symptoms: YES  Anxiety symptoms: YES    History:  Caffeine intake: Small  Loss of appetite: no  Healthy diet: YES  Did you have problems in school or with previous employment: not applicable  Family history of ADHD: no  Have you had an evaluation for ADHD in the past: no  Do you use alcohol or drugs: YES- social drinks     Therapies tried: medication not used with no relief     learning more about it and feels he could have it.   Sometime gets irritated and sometime mild noise - gets fixted and distracted easily.   Hard to pin point when it started.   Girlfriend has it and she goes through some of same things.   In childhood - it was not brought in. Did OK at school.   No known family history of adhd.     Review of Systems         Objective           Vitals:  No vitals were obtained today due to virtual visit.    Physical Exam   GENERAL: Healthy, alert and no distress  EYES: Eyes grossly normal to inspection.  No discharge or erythema, or obvious scleral/conjunctival abnormalities.  RESP: No audible wheeze, cough, or visible cyanosis.  No visible retractions or increased work of breathing.    SKIN: Visible skin clear. No significant rash, abnormal pigmentation or lesions.  NEURO: Cranial nerves grossly  intact.  Mentation and speech appropriate for age.  PSYCH: Mentation appears normal, affect normal/bright, judgement and insight intact, normal speech and appearance well-groomed.                Video-Visit Details    Type of service:  Video Visit    Video End Time:3:15 PM    Originating Location (pt. Location): Home    Distant Location (provider location):  [facility] [place]     Platform used for Video Visit: Desti

## 2021-08-25 ENCOUNTER — VIRTUAL VISIT (OUTPATIENT)
Dept: FAMILY MEDICINE | Facility: CLINIC | Age: 36
End: 2021-08-25
Payer: COMMERCIAL

## 2021-08-25 DIAGNOSIS — R41.840 INATTENTION: Primary | ICD-10-CM

## 2021-08-25 PROCEDURE — 99213 OFFICE O/P EST LOW 20 MIN: CPT | Mod: GT | Performed by: FAMILY MEDICINE

## 2021-08-25 ASSESSMENT — ANXIETY QUESTIONNAIRES
3. WORRYING TOO MUCH ABOUT DIFFERENT THINGS: SEVERAL DAYS
2. NOT BEING ABLE TO STOP OR CONTROL WORRYING: NOT AT ALL
GAD7 TOTAL SCORE: 5
1. FEELING NERVOUS, ANXIOUS, OR ON EDGE: NOT AT ALL
7. FEELING AFRAID AS IF SOMETHING AWFUL MIGHT HAPPEN: NOT AT ALL
5. BEING SO RESTLESS THAT IT IS HARD TO SIT STILL: SEVERAL DAYS
4. TROUBLE RELAXING: SEVERAL DAYS
GAD7 TOTAL SCORE: 5
6. BECOMING EASILY ANNOYED OR IRRITABLE: MORE THAN HALF THE DAYS
GAD7 TOTAL SCORE: 5
8. IF YOU CHECKED OFF ANY PROBLEMS, HOW DIFFICULT HAVE THESE MADE IT FOR YOU TO DO YOUR WORK, TAKE CARE OF THINGS AT HOME, OR GET ALONG WITH OTHER PEOPLE?: SOMEWHAT DIFFICULT
7. FEELING AFRAID AS IF SOMETHING AWFUL MIGHT HAPPEN: NOT AT ALL

## 2021-08-25 ASSESSMENT — PATIENT HEALTH QUESTIONNAIRE - PHQ9
SUM OF ALL RESPONSES TO PHQ QUESTIONS 1-9: 6
10. IF YOU CHECKED OFF ANY PROBLEMS, HOW DIFFICULT HAVE THESE PROBLEMS MADE IT FOR YOU TO DO YOUR WORK, TAKE CARE OF THINGS AT HOME, OR GET ALONG WITH OTHER PEOPLE: SOMEWHAT DIFFICULT
SUM OF ALL RESPONSES TO PHQ QUESTIONS 1-9: 6

## 2021-08-26 ASSESSMENT — PATIENT HEALTH QUESTIONNAIRE - PHQ9: SUM OF ALL RESPONSES TO PHQ QUESTIONS 1-9: 6

## 2021-08-26 ASSESSMENT — ANXIETY QUESTIONNAIRES: GAD7 TOTAL SCORE: 5

## 2021-10-03 ENCOUNTER — HEALTH MAINTENANCE LETTER (OUTPATIENT)
Age: 36
End: 2021-10-03

## 2021-12-23 ENCOUNTER — TRANSFERRED RECORDS (OUTPATIENT)
Dept: HEALTH INFORMATION MANAGEMENT | Facility: CLINIC | Age: 36
End: 2021-12-23
Payer: COMMERCIAL

## 2022-02-15 ENCOUNTER — VIRTUAL VISIT (OUTPATIENT)
Dept: FAMILY MEDICINE | Facility: CLINIC | Age: 37
End: 2022-02-15
Payer: COMMERCIAL

## 2022-02-15 DIAGNOSIS — F41.9 RECURRENT MILD MAJOR DEPRESSIVE DISORDER WITH ANXIETY (H): Primary | ICD-10-CM

## 2022-02-15 DIAGNOSIS — F90.0 ATTENTION DEFICIT HYPERACTIVITY DISORDER (ADHD), PREDOMINANTLY INATTENTIVE TYPE: ICD-10-CM

## 2022-02-15 DIAGNOSIS — F33.0 RECURRENT MILD MAJOR DEPRESSIVE DISORDER WITH ANXIETY (H): Primary | ICD-10-CM

## 2022-02-15 DIAGNOSIS — F42.4 SKIN-PICKING DISORDER: ICD-10-CM

## 2022-02-15 PROCEDURE — 99214 OFFICE O/P EST MOD 30 MIN: CPT | Mod: GT | Performed by: FAMILY MEDICINE

## 2022-02-15 RX ORDER — DEXTROAMPHETAMINE SACCHARATE, AMPHETAMINE ASPARTATE MONOHYDRATE, DEXTROAMPHETAMINE SULFATE AND AMPHETAMINE SULFATE 2.5; 2.5; 2.5; 2.5 MG/1; MG/1; MG/1; MG/1
10 CAPSULE, EXTENDED RELEASE ORAL DAILY
Qty: 30 CAPSULE | Refills: 0 | Status: SHIPPED | OUTPATIENT
Start: 2022-02-15 | End: 2022-03-17

## 2022-02-15 ASSESSMENT — ANXIETY QUESTIONNAIRES
GAD7 TOTAL SCORE: 2
6. BECOMING EASILY ANNOYED OR IRRITABLE: SEVERAL DAYS
7. FEELING AFRAID AS IF SOMETHING AWFUL MIGHT HAPPEN: NOT AT ALL
1. FEELING NERVOUS, ANXIOUS, OR ON EDGE: NOT AT ALL
3. WORRYING TOO MUCH ABOUT DIFFERENT THINGS: NOT AT ALL
GAD7 TOTAL SCORE: 2
GAD7 TOTAL SCORE: 2
2. NOT BEING ABLE TO STOP OR CONTROL WORRYING: NOT AT ALL
4. TROUBLE RELAXING: SEVERAL DAYS
5. BEING SO RESTLESS THAT IT IS HARD TO SIT STILL: NOT AT ALL
7. FEELING AFRAID AS IF SOMETHING AWFUL MIGHT HAPPEN: NOT AT ALL

## 2022-02-15 ASSESSMENT — PATIENT HEALTH QUESTIONNAIRE - PHQ9
SUM OF ALL RESPONSES TO PHQ QUESTIONS 1-9: 8
SUM OF ALL RESPONSES TO PHQ QUESTIONS 1-9: 8
10. IF YOU CHECKED OFF ANY PROBLEMS, HOW DIFFICULT HAVE THESE PROBLEMS MADE IT FOR YOU TO DO YOUR WORK, TAKE CARE OF THINGS AT HOME, OR GET ALONG WITH OTHER PEOPLE: SOMEWHAT DIFFICULT

## 2022-02-15 NOTE — PROGRESS NOTES
"ROLANDO is a 37 year old who is being evaluated via a billable video visit.      How would you like to obtain your AVS? MyChart  If the video visit is dropped, the invitation should be resent by: Send to e-mail at: [email or web address]  Will anyone else be joining your video visit? No      Video Start Time: 4:07 PM    Assessment & Plan     Recurrent mild major depressive disorder with anxiety (H)  Reviewed psychological evaluation.  He has a long history of some anxiety and skin picking disorder.  We have tried Celexa but he has not used for long-term.  He is still trying to avoid something on daily basis.  We discussed treatment with stimulants often masks underlying anxiety and depression and can worsen her symptoms.  He understood.  We also discussed therapy.    Attention deficit hyperactivity disorder (ADHD), predominantly inattentive type  Reviewed psychological evaluation.  We discussed stimulants are mainstay of treatment but strattera that can be a good choice which may help with mood also as it is SNRI eventhough it is only approved for ADHD.  He would rather avoid something on a daily basis and would like to take something on a as needed basis.  We discussed controlled substance, risk benefits and side effects.  He understood.  We discussed clinic policy.  I will refill 1 month of prescription and adjust dose in a month and go from there.  He understood.  - amphetamine-dextroamphetamine (ADDERALL XR) 10 MG 24 hr capsule; Take 1 capsule (10 mg) by mouth daily    Skin-picking disorder  Feeling overall really well.  Does not think it needs any other intervention.               BMI:   Estimated body mass index is 30 kg/m  as calculated from the following:    Height as of 4/20/21: 1.905 m (6' 3\").    Weight as of 4/20/21: 108.9 kg (240 lb).           No follow-ups on file.    Tay Christiansen MD, MD  [facility]    Javier MARSHALL is a 37 year old who presents for " the following health issues     HPI     Depression and Anxiety Follow-Up    How are you doing with your depression since your last visit? No change    How are you doing with your anxiety since your last visit?  No change    Are you having other symptoms that might be associated with depression or anxiety? No    Have you had a significant life event? OTHER: dog is sick     Do you have any concerns with your use of alcohol or other drugs? No    Social History     Tobacco Use     Smoking status: Never Smoker     Smokeless tobacco: Never Used   Vaping Use     Vaping Use: Never used   Substance Use Topics     Alcohol use: Yes     Comment: 2 drinks a week      Drug use: No     PHQ 4/20/2021 8/25/2021 2/15/2022   PHQ-9 Total Score 8 6 8   Q9: Thoughts of better off dead/self-harm past 2 weeks Not at all Not at all Not at all   F/U: Thoughts of suicide or self-harm - - -   F/U: Safety concerns - - -     NEERAJ-7 SCORE 4/20/2021 8/25/2021 2/15/2022   Total Score 6 (mild anxiety) 5 (mild anxiety) 2 (minimal anxiety)   Total Score 6 5 2     Last PHQ-9 2/15/2022   1.  Little interest or pleasure in doing things 1   2.  Feeling down, depressed, or hopeless 0   3.  Trouble falling or staying asleep, or sleeping too much 0   4.  Feeling tired or having little energy 3   5.  Poor appetite or overeating 0   6.  Feeling bad about yourself 0   7.  Trouble concentrating 3   8.  Moving slowly or restless 1   Q9: Thoughts of better off dead/self-harm past 2 weeks 0   PHQ-9 Total Score 8   Difficulty at work, home, or with people -   In the past two weeks have you had thoughts of suicide or self harm? -   Do you have concerns about your personal safety or the safety of others? -     NEERAJ-7  2/15/2022   1. Feeling nervous, anxious, or on edge 0   2. Not being able to stop or control worrying 0   3. Worrying too much about different things 0   4. Trouble relaxing 1   5. Being so restless that it is hard to sit still 0   6. Becoming easily  "annoyed or irritable 1   7. Feeling afraid, as if something awful might happen 0   NEERAJ-7 Total Score 2   If you checked any problems, how difficult have they made it for you to do your work, take care of things at home, or get along with other people? -     Suicide Assessment Five-step Evaluation and Treatment (SAFE-T)      Physical health - may have gained some weight. Some general soreness. Feels can do better with exercise.     Mood - stress is more at work and home. Bouts of depression come and goes. Some frustration at a time.     Did psychological evaluation for adhd - got a report that suggested ADHD inattentive type, depression, anxiety, skin picking disorder, hair pulling disorder, non verbal processing disorder.      Never had adhd meds before.      Therapy helps with skin picking disorder. Lotion helps. Skin is not as bad as it was before.        Review of Systems         Objective    Vitals - Patient Reported  Weight (Patient Reported): 108.9 kg (240 lb)  Height (Patient Reported): 188 cm (6' 2\")  BMI (Based on Pt Reported Ht/Wt): 30.81  Temperature (Patient Reported): 98.6  F (37  C)    Physical Exam   GENERAL: Healthy, alert and no distress  EYES: Eyes grossly normal to inspection.  No discharge or erythema, or obvious scleral/conjunctival abnormalities.  RESP: No audible wheeze, cough, or visible cyanosis.  No visible retractions or increased work of breathing.   SKIN: Visible skin clear. No significant rash, abnormal pigmentation or lesions.  NEURO: Cranial nerves grossly intact.  Mentation and speech appropriate for age.  PSYCH: Mentation appears normal, affect normal/bright, judgement and insight intact, normal speech and appearance well-groomed.                Video-Visit Details    Type of service:  Video Visit    Video End Time:4:36 PM    Originating Location (pt. Location): Home    Distant Location (provider location):  [facility]     Platform used for Video Visit: " AmWell  Answers for HPI/ROS submitted by the patient on 2/15/2022  If you checked off any problems, how difficult have these problems made it for you to do your work, take care of things at home, or get along with other people?: Somewhat difficult  PHQ9 TOTAL SCORE: 8  NEERAJ 7 TOTAL SCORE: 2

## 2022-02-16 ENCOUNTER — TELEPHONE (OUTPATIENT)
Dept: FAMILY MEDICINE | Facility: CLINIC | Age: 37
End: 2022-02-16
Payer: COMMERCIAL

## 2022-02-16 ASSESSMENT — ANXIETY QUESTIONNAIRES: GAD7 TOTAL SCORE: 2

## 2022-02-16 ASSESSMENT — PATIENT HEALTH QUESTIONNAIRE - PHQ9: SUM OF ALL RESPONSES TO PHQ QUESTIONS 1-9: 8

## 2022-02-16 NOTE — TELEPHONE ENCOUNTER
Prior Authorization Retail Medication Request    Medication/Dose: amphetamine-dextroamphetamine (ADDERALL XR) 10 MG 24 hr capsule  ICD code (if different than what is on RX):  Previously Tried and Failed:  Rationale:    Insurance Name:  [employer]  Insurance ID: [id number]    Pharmacy Information (if different than what is on RX)  Name:  Phone:    Please include previous medications tried and failed.  Please ask insurance for medications on formulary.

## 2022-02-17 PROBLEM — K21.9 GASTROESOPHAGEAL REFLUX DISEASE: Status: ACTIVE | Noted: 2018-05-08

## 2022-02-21 ENCOUNTER — ANCILLARY PROCEDURE (OUTPATIENT)
Dept: GENERAL RADIOLOGY | Facility: CLINIC | Age: 37
End: 2022-02-21
Attending: PHYSICIAN ASSISTANT
Payer: COMMERCIAL

## 2022-02-21 ENCOUNTER — OFFICE VISIT (OUTPATIENT)
Dept: URGENT CARE | Facility: URGENT CARE | Age: 37
End: 2022-02-21
Payer: COMMERCIAL

## 2022-02-21 VITALS
OXYGEN SATURATION: 99 % | TEMPERATURE: 98.8 F | DIASTOLIC BLOOD PRESSURE: 88 MMHG | HEIGHT: 74 IN | HEART RATE: 92 BPM | SYSTOLIC BLOOD PRESSURE: 142 MMHG | WEIGHT: 220 LBS | BODY MASS INDEX: 28.23 KG/M2

## 2022-02-21 DIAGNOSIS — S69.91XA INJURY OF RIGHT HAND, INITIAL ENCOUNTER: ICD-10-CM

## 2022-02-21 DIAGNOSIS — S62.614A CLOSED DISPLACED FRACTURE OF PROXIMAL PHALANX OF RIGHT RING FINGER, INITIAL ENCOUNTER: Primary | ICD-10-CM

## 2022-02-21 PROCEDURE — 99214 OFFICE O/P EST MOD 30 MIN: CPT | Performed by: PHYSICIAN ASSISTANT

## 2022-02-21 PROCEDURE — 73140 X-RAY EXAM OF FINGER(S): CPT | Mod: RT | Performed by: RADIOLOGY

## 2022-02-21 NOTE — PATIENT INSTRUCTIONS
Patient was educated on the natural course of injury.  X-ray reveals comminuted fracture of right 4th proximal phalanx. Finger splint given. He may do buddy taping as well. Conservative measures discussed including rest, ice, compression, elevation, and over-the-counter analgesics (Tylenol or Ibuprofen) as needed. See your primary care provider in 7 days. Seek emergency care if you develop severe pain/swelling, inability to move extremity, skin paleness, or weakness.

## 2022-02-21 NOTE — PROGRESS NOTES
"URGENT CARE VISIT:    SUBJECTIVE:   Chief Complaint   Patient presents with     Urgent Care     Pt in clinic to have eval for right hand injury.     Hand Injury      Johan Cole is a 37 year old male who presents with a chief complaint of right hand pain.  Symptoms began 1 day(s) ago, are moderate and sudden onset  Was playing around and he hit hand on desk. He popped the dislocated finger joint back in.  Pain exacerbated by movement. Relieved by rest.  He treated it initially with no therapy. This is the first time this type of injury has occurred to this patient.     PMH: History reviewed. No pertinent past medical history.  Allergies: Bees   Medications:   Current Outpatient Medications   Medication Sig Dispense Refill     amphetamine-dextroamphetamine (ADDERALL XR) 10 MG 24 hr capsule Take 1 capsule (10 mg) by mouth daily 30 capsule 0     Social History:   Social History     Tobacco Use     Smoking status: Never Smoker     Smokeless tobacco: Never Used   Substance Use Topics     Alcohol use: Yes     Comment: 2 drinks a week        ROS:  Review of systems negative except as stated above.    OBJECTIVE:  BP (!) 142/88   Pulse 92   Temp 98.8  F (37.1  C) (Temporal)   Ht 1.88 m (6' 2\")   Wt 99.8 kg (220 lb)   SpO2 99%   BMI 28.25 kg/m    GENERAL APPEARANCE: healthy, alert and no distress  MUSCULOSKELETAL: mild TTP over right 4th proximal phalanx. FROM.   EXTREMITIES: peripheral pulses normal  SKIN: moderate edema over right 4th proximal phalanx.  NEURO: sensation intact     IMAGING:  Results for orders placed or performed in visit on 02/21/22   XR Finger Right G/E 2 Views     Status: None    Narrative    FINGER RIGHT TWO OR MORE VIEWS   2/21/2022 4:19 PM     HISTORY:  Injury of right hand, initial encounter    COMPARISON: None.      Impression    IMPRESSION: Comminuted fracture in the proximal phalanx of the ring  finger. This is at the junction of the proximal and middle thirds.  There is up to 1 mm of " displacement and there is minimal apex volar  angulation. Otherwise negative.    BARB TORRES MD         SYSTEM ID:  [id number]       ASSESSMENT:    ICD-10-CM    1. Closed displaced fracture of proximal phalanx of right ring finger, initial encounter  S62.614A XR Finger Right G/E 2 Views     CANCELED: XR Hand Right G/E 3 Views       PLAN:  30 minutes spent on the date of the encounter doing chart review, review of test results, interpretation of tests, patient visit and documentation.   Patient Instructions   Patient was educated on the natural course of injury.  X-ray reveals comminuted fracture of right 4th proximal phalanx. Finger splint given. He may do buddy taping as well. Conservative measures discussed including rest, ice, compression, elevation, and over-the-counter analgesics (Tylenol or Ibuprofen) as needed. See your primary care provider in 7 days. Seek emergency care if you develop severe pain/swelling, inability to move extremity, skin paleness, or weakness.     Patient verbalized understanding and is agreeable to plan. The patient was discharged ambulatory and in stable condition.    Fanta Miller PA-C on 2/21/2022 at 4:55 PM

## 2022-02-22 NOTE — TELEPHONE ENCOUNTER
Prior Authorization Not Needed per Insurance    Medication: amphetamine-dextroamphetamine (ADDERALL XR) 10 MG 24 hr capsule, rec 2-15-22  Insurance Company:    Expected CoPay:      Pharmacy Filling the Rx: [id number] - [place], [place] - [address] [facility]  Pharmacy Notified: Yes  Patient Notified: Yes  **Instructed pharmacy to notify patient when script is ready to /ship.**      I called the pharmacy and they did not have any insurance information on file for the patient. They used a discount card. They will reach out to the patient to get his insurance information and if it needs a PA they will let us know.

## 2022-03-14 ENCOUNTER — MYC MEDICAL ADVICE (OUTPATIENT)
Dept: FAMILY MEDICINE | Facility: CLINIC | Age: 37
End: 2022-03-14

## 2022-03-14 ENCOUNTER — OFFICE VISIT (OUTPATIENT)
Dept: FAMILY MEDICINE | Facility: CLINIC | Age: 37
End: 2022-03-14
Payer: COMMERCIAL

## 2022-03-14 ENCOUNTER — ANCILLARY PROCEDURE (OUTPATIENT)
Dept: GENERAL RADIOLOGY | Facility: CLINIC | Age: 37
End: 2022-03-14
Attending: FAMILY MEDICINE
Payer: COMMERCIAL

## 2022-03-14 VITALS
BODY MASS INDEX: 30.29 KG/M2 | OXYGEN SATURATION: 97 % | HEART RATE: 94 BPM | HEIGHT: 74 IN | DIASTOLIC BLOOD PRESSURE: 82 MMHG | WEIGHT: 236 LBS | SYSTOLIC BLOOD PRESSURE: 112 MMHG | RESPIRATION RATE: 16 BRPM | TEMPERATURE: 98.5 F

## 2022-03-14 DIAGNOSIS — S62.614D CLOSED DISPLACED FRACTURE OF PROXIMAL PHALANX OF RIGHT RING FINGER WITH ROUTINE HEALING, SUBSEQUENT ENCOUNTER: ICD-10-CM

## 2022-03-14 DIAGNOSIS — F90.0 ATTENTION DEFICIT HYPERACTIVITY DISORDER (ADHD), PREDOMINANTLY INATTENTIVE TYPE: Primary | ICD-10-CM

## 2022-03-14 PROCEDURE — 99214 OFFICE O/P EST MOD 30 MIN: CPT | Performed by: FAMILY MEDICINE

## 2022-03-14 PROCEDURE — 73140 X-RAY EXAM OF FINGER(S): CPT | Mod: RT | Performed by: RADIOLOGY

## 2022-03-14 RX ORDER — DEXTROAMPHETAMINE SACCHARATE, AMPHETAMINE ASPARTATE MONOHYDRATE, DEXTROAMPHETAMINE SULFATE AND AMPHETAMINE SULFATE 3.75; 3.75; 3.75; 3.75 MG/1; MG/1; MG/1; MG/1
15 CAPSULE, EXTENDED RELEASE ORAL DAILY
Qty: 30 CAPSULE | Refills: 0 | Status: SHIPPED | OUTPATIENT
Start: 2022-04-14 | End: 2022-05-14

## 2022-03-14 RX ORDER — DEXTROAMPHETAMINE SACCHARATE, AMPHETAMINE ASPARTATE MONOHYDRATE, DEXTROAMPHETAMINE SULFATE AND AMPHETAMINE SULFATE 2.5; 2.5; 2.5; 2.5 MG/1; MG/1; MG/1; MG/1
10 CAPSULE, EXTENDED RELEASE ORAL DAILY
Qty: 30 CAPSULE | Refills: 0 | Status: CANCELLED | OUTPATIENT
Start: 2022-03-14

## 2022-03-14 RX ORDER — DEXTROAMPHETAMINE SACCHARATE, AMPHETAMINE ASPARTATE MONOHYDRATE, DEXTROAMPHETAMINE SULFATE AND AMPHETAMINE SULFATE 3.75; 3.75; 3.75; 3.75 MG/1; MG/1; MG/1; MG/1
15 CAPSULE, EXTENDED RELEASE ORAL DAILY
Qty: 30 CAPSULE | Refills: 0 | Status: SHIPPED | OUTPATIENT
Start: 2022-03-14 | End: 2022-04-13

## 2022-03-14 RX ORDER — DEXTROAMPHETAMINE SACCHARATE, AMPHETAMINE ASPARTATE MONOHYDRATE, DEXTROAMPHETAMINE SULFATE AND AMPHETAMINE SULFATE 3.75; 3.75; 3.75; 3.75 MG/1; MG/1; MG/1; MG/1
15 CAPSULE, EXTENDED RELEASE ORAL DAILY
Qty: 30 CAPSULE | Refills: 0 | Status: SHIPPED | OUTPATIENT
Start: 2022-05-15 | End: 2022-05-31

## 2022-03-14 ASSESSMENT — ANXIETY QUESTIONNAIRES
3. WORRYING TOO MUCH ABOUT DIFFERENT THINGS: SEVERAL DAYS
4. TROUBLE RELAXING: SEVERAL DAYS
1. FEELING NERVOUS, ANXIOUS, OR ON EDGE: NOT AT ALL
2. NOT BEING ABLE TO STOP OR CONTROL WORRYING: NOT AT ALL
GAD7 TOTAL SCORE: 4
6. BECOMING EASILY ANNOYED OR IRRITABLE: SEVERAL DAYS
5. BEING SO RESTLESS THAT IT IS HARD TO SIT STILL: SEVERAL DAYS
7. FEELING AFRAID AS IF SOMETHING AWFUL MIGHT HAPPEN: NOT AT ALL
GAD7 TOTAL SCORE: 4
GAD7 TOTAL SCORE: 4
7. FEELING AFRAID AS IF SOMETHING AWFUL MIGHT HAPPEN: NOT AT ALL

## 2022-03-14 ASSESSMENT — ENCOUNTER SYMPTOMS: NERVOUS/ANXIOUS: 1

## 2022-03-14 ASSESSMENT — PATIENT HEALTH QUESTIONNAIRE - PHQ9
SUM OF ALL RESPONSES TO PHQ QUESTIONS 1-9: 3
10. IF YOU CHECKED OFF ANY PROBLEMS, HOW DIFFICULT HAVE THESE PROBLEMS MADE IT FOR YOU TO DO YOUR WORK, TAKE CARE OF THINGS AT HOME, OR GET ALONG WITH OTHER PEOPLE: SOMEWHAT DIFFICULT
SUM OF ALL RESPONSES TO PHQ QUESTIONS 1-9: 3

## 2022-03-14 NOTE — PATIENT INSTRUCTIONS
Did you know?      You can schedule a video visit for follow-up appointments as well as future appointments for certain conditions.  Please see the below link.     [email or web address]    If you have not already done so,  I encourage you to sign up for [email or web address] ([email or web address]).  This will allow you to review your results, securely communicate with a provider, and schedule virtual visits as well.

## 2022-03-14 NOTE — PROGRESS NOTES
"  Assessment & Plan     Attention deficit hyperactivity disorder (ADHD), predominantly inattentive type  Helping but not lasting too long. Offered 15mg dose. He is willing to try. If too much, will notify me and I can adjust rx back to 10mg. He agreed. Understands underlying depression and anxiety monitoring. Not on any meds.   - amphetamine-dextroamphetamine (ADDERALL XR) 15 MG 24 hr capsule; Take 1 capsule (15 mg) by mouth daily  - amphetamine-dextroamphetamine (ADDERALL XR) 15 MG 24 hr capsule; Take 1 capsule (15 mg) by mouth daily  - amphetamine-dextroamphetamine (ADDERALL XR) 15 MG 24 hr capsule; Take 1 capsule (15 mg) by mouth daily    Closed displaced fracture of proximal phalanx of right ring finger with routine healing, subsequent encounter  Has comminuted fracture with mild displacement. Should have follow up with hand specialist as it is his dominant hand. It is reasonable to repeat xray today and pending result hand specialist referral. He understood.   - XR Finger Right G/E 2 Views; Future  Ortho  referral.              BMI:   Estimated body mass index is 30.3 kg/m  as calculated from the following:    Height as of this encounter: 1.88 m (6' 2\").    Weight as of this encounter: 107 kg (236 lb).           Return in about 3 months (around 6/14/2022).    Tay Christiansen MD, MD  [facility]    Javier MARSHALL is a 37 year old who presents for the following health issues     A.D.H.D    Anxiety  This is a chronic problem.   History of Present Illness       Mental Health Follow-up:  Patient presents to follow-up on Depression & Anxiety.Patient's depression since last visit has been:  Good  The patient is not having other symptoms associated with depression.  Patient's anxiety since last visit has been:  Good  The patient is not having other symptoms associated with anxiety.  Any significant life events: No  Patient is not feeling anxious or having panic " "attacks.  Patient has no concerns about alcohol or drug use.       Today's PHQ-9         PHQ-9 Total Score: 3  PHQ-9 Q9 Thoughts of better off dead/self-harm past 2 weeks :   (P) Not at all    How difficult have these problems made it for you to do your work, take care of things at home, or get along with other people: Somewhat difficult    Today's NEERAJ-7 Score: 4    Reason for visit:  Medication check up    He eats 2-3 servings of fruits and vegetables daily.He consumes 1 sweetened beverage(s) daily.He exercises with enough effort to increase his heart rate 9 or less minutes per day.  He exercises with enough effort to increase his heart rate 3 or less days per week.   He is taking medications regularly.     Medication Followup of Adderall Xr 10mg     Taking Medication as prescribed: yes    Side Effects:  None    Medication Helping Symptoms:  yes     Feels it is like a coffee. Feeling more calm. Able to focus in conversation.   Late afternoon some worsening of symptoms again.   Sleep - not an issue.   No palpitation.   Managing depression well. Thinking about therapy again. Did on and off therapy in the past.     Right ring finger - fracture.   Right handed.     Review of Systems   Psychiatric/Behavioral: The patient is nervous/anxious.             Objective    /82 (BP Location: Right arm, Patient Position: Sitting, Cuff Size: Adult Large)   Pulse 94   Temp 98.5  F (36.9  C) (Temporal)   Resp 16   Ht 1.88 m (6' 2\")   Wt 107 kg (236 lb)   SpO2 97%   BMI 30.30 kg/m    Body mass index is 30.3 kg/m .  Physical Exam   Right ring finger in splint.   Lungs clear  Heart RRR.                 "

## 2022-03-15 ENCOUNTER — TELEPHONE (OUTPATIENT)
Dept: FAMILY MEDICINE | Facility: CLINIC | Age: 37
End: 2022-03-15
Payer: COMMERCIAL

## 2022-03-15 ASSESSMENT — ANXIETY QUESTIONNAIRES: GAD7 TOTAL SCORE: 4

## 2022-03-15 ASSESSMENT — PATIENT HEALTH QUESTIONNAIRE - PHQ9: SUM OF ALL RESPONSES TO PHQ QUESTIONS 1-9: 3

## 2022-03-15 NOTE — TELEPHONE ENCOUNTER
See 3/15/22 telephone encounter for prior authorization.    Writer responded via [employer].      SHAINA Castellano, RN  [facility]

## 2022-03-15 NOTE — TELEPHONE ENCOUNTER
Please start prior authorization for amphetamine-dextroamphetamine (ADDERALL XR) 15 MG 24 hr capsule.    Thank you!  SHAINA Castellano, RN  [facility]

## 2022-03-16 ENCOUNTER — TELEPHONE (OUTPATIENT)
Dept: ORTHOPEDICS | Facility: CLINIC | Age: 37
End: 2022-03-16
Payer: COMMERCIAL

## 2022-03-16 ENCOUNTER — DOCUMENTATION ONLY (OUTPATIENT)
Dept: ORTHOPEDICS | Facility: CLINIC | Age: 37
End: 2022-03-16
Payer: COMMERCIAL

## 2022-03-16 ENCOUNTER — TELEPHONE (OUTPATIENT)
Dept: ORTHOPEDICS | Facility: CLINIC | Age: 37
End: 2022-03-16

## 2022-03-16 NOTE — PROGRESS NOTES
Orthopedic/Sports Medicine Fracture Triage    Incoming call/or message from referral team member.    Fracture type: Finger.    The patient is in a  splint/bobby taping.    Date of injury 2/20/22.    Triaged by: Dr. Shaw.    Determined to be managed Non operatively.    Needs to be seen within 1 week.    Additional Comments/information: Due to time since injury and progress of healing, provider determined non operative treatment.    Nick Lomeli, EMT

## 2022-03-16 NOTE — TELEPHONE ENCOUNTER
I attempted to call the patient to schedule a consult appt with sports medicine provider for ring ring finger proximal phalanx fx. No answer, left our number to call back and schedule. Hemalatha Eason, GADIEL on 3/16/2022 at 10:09 AM

## 2022-03-16 NOTE — TELEPHONE ENCOUNTER
DIAGNOSIS: Schedule with Sports Med per TE from [facility]-Closed displaced fracture of proximal phalanx of right ring finger with routine healing/ Dr Christiansen/ XR   APPOINTMENT DATE: 3.22.22   NOTES STATUS DETAILS   OFFICE NOTE from referring provider Internal 3.14.22 Dr Tay Christiansen, [facility]   OFFICE NOTE from other specialist Internal 2.21.22 [facility]   MEDICATION LIST Internal    XRAYS (IMAGES & REPORTS) Internal 3.14.22 R finger  2.21.22 R finger

## 2022-03-16 NOTE — TELEPHONE ENCOUNTER
Patient has a referral regarding Closed displaced fracture of proximal phalanx of right ring finger with routine healing.  Sending this for review to make sure patient is scheduled with appropriate provider.  Please advise.  Thank you!

## 2022-03-18 NOTE — TELEPHONE ENCOUNTER
Central Prior Authorization Team   Phone: [phone]      PA Initiation    Medication: amphetamine-dextroamphetamine (ADDERALL XR) 15 MG 24 hr capsule  Insurance Company: [employer] - Phone [phone] Fax [phone]  Pharmacy Filling the Rx: [id number] - [place], [place] - [address] AT [facility] [address]  Filling Pharmacy Phone: [phone]  Filling Pharmacy Fax:    Start Date: 3/18/2022    Called [employer] and spoke with Dora, she is going to have a PA form faxed over that needs to be completed and faxed back.

## 2022-03-21 NOTE — TELEPHONE ENCOUNTER
Prior Authorization Approval    Authorization Effective Date: 3/18/2022  Authorization Expiration Date: 12/31/2099  Medication: amphetamine-dextroamphetamine (ADDERALL XR) 15 MG 24 hr capsule-APPROVED  Approved Dose/Quantity:   Reference #: ID [id number]   Insurance Company: [employer] - Phone [phone] Fax [phone]  Expected CoPay:       CoPay Card Available:      Foundation Assistance Needed:    Which Pharmacy is filling the prescription (Not needed for infusion/clinic administered): [id number] - [place], [place] - [address] AT [facility] [address]  Pharmacy Notified: Yes  Patient Notified: No        Called and spoke with Tonie at [employer] to say we still have not received forms. She states that the medication was approved and she see's that the pharmacy got a paid claim on 3/18/22.

## 2022-03-22 ENCOUNTER — PRE VISIT (OUTPATIENT)
Dept: ORTHOPEDICS | Facility: CLINIC | Age: 37
End: 2022-03-22
Payer: COMMERCIAL

## 2022-03-22 ENCOUNTER — ANCILLARY PROCEDURE (OUTPATIENT)
Dept: GENERAL RADIOLOGY | Facility: CLINIC | Age: 37
End: 2022-03-22
Attending: FAMILY MEDICINE
Payer: COMMERCIAL

## 2022-03-22 ENCOUNTER — OFFICE VISIT (OUTPATIENT)
Dept: ORTHOPEDICS | Facility: CLINIC | Age: 37
End: 2022-03-22
Payer: COMMERCIAL

## 2022-03-22 DIAGNOSIS — S62.614D CLOSED DISPLACED FRACTURE OF PROXIMAL PHALANX OF RIGHT RING FINGER WITH ROUTINE HEALING, SUBSEQUENT ENCOUNTER: ICD-10-CM

## 2022-03-22 PROCEDURE — 73140 X-RAY EXAM OF FINGER(S): CPT | Mod: RT | Performed by: RADIOLOGY

## 2022-03-22 PROCEDURE — 99203 OFFICE O/P NEW LOW 30 MIN: CPT | Performed by: FAMILY MEDICINE

## 2022-03-22 NOTE — PROGRESS NOTES
ASSESSMENT/PLAN:    (S62.614D) Closed displaced fracture of proximal phalanx of right ring finger with routine healing, subsequent encounter  Comment: repeat xray w/ stable fx and evidence of bony healing; clinically no pain at fx site; will transition out of splint to bobby taping and get him into hand therapy for ROM; f/u prn   Plan: XR Finger Right G/E 2 Views, Hand Therapy Referral          Marco A Doe MD  March 22, 2022  1:50 PM        Pt is a 37 year old male referred by Dr Christiansen here today for:     Right Ring Wrist/ Hand pain:   Location: PIP joint   Duration: 4 weeks   Trauma/ Fall? Punched desk    Swelling? A little    Numbness/ Tingling? No    Weakness? Yes    Imaging? Yes    Treatment? Splint        Per Dr Christiansen note:  Closed displaced fracture of proximal phalanx of right ring finger with routine healing, subsequent encounter  Has comminuted fracture with mild displacement. Should have follow up with hand specialist as it is his dominant hand. It is reasonable to repeat xray today and pending result hand specialist referral. He understood.   - XR Finger Right G/E 2 Views; Future  Ortho  referral.       No past medical history on file.   No past surgical history on file.   Current Outpatient Medications   Medication Sig Dispense Refill     amphetamine-dextroamphetamine (ADDERALL XR) 15 MG 24 hr capsule Take 1 capsule (15 mg) by mouth daily 30 capsule 0     [START ON 4/14/2022] amphetamine-dextroamphetamine (ADDERALL XR) 15 MG 24 hr capsule Take 1 capsule (15 mg) by mouth daily 30 capsule 0     [START ON 5/15/2022] amphetamine-dextroamphetamine (ADDERALL XR) 15 MG 24 hr capsule Take 1 capsule (15 mg) by mouth daily 30 capsule 0      Allergies   Allergen Reactions     Bees Hives      ROS:   Gen- no fevers/chills   Rheum - no morning stiffness   Derm - no rash/ redness   Neuro - no numbness, no tingling   Remainder of ROS negative.     Exam:   There were no vitals taken for this visit.       R  WRIST/HAND:   Inspection: Swelling - YES - 4th digit; Atrophy - NO   Sensation: intact in median, radial, ulnar distribution   ROM:   Hand: Limited flexion at PIP of ring finger.   Strength: deferred  Bony tenderness:   Hand: Metacarpals: No; Phalanges: non-tender at fx site   Tendons: FDS/FDP/Extensor mechanism intact   Maneuvers: deferred    Repeat xray finger - 3/22/22 at [facility] [place]    Stable transverse fx of proximal phalanx w/ callus

## 2022-03-22 NOTE — LETTER
3/22/2022      RE: Johan Cole  [address] [address]       ASSESSMENT/PLAN:    (A08.203D) Closed displaced fracture of proximal phalanx of right ring finger with routine healing, subsequent encounter  Comment: repeat xray w/ stable fx and evidence of bony healing; clinically no pain at fx site; will transition out of splint to bobby taping and get him into hand therapy for ROM; f/u prn   Plan: XR Finger Right G/E 2 Views, Hand Therapy Referral          Marco A Doe MD  March 22, 2022  1:50 PM        Pt is a 37 year old male referred by Dr Christiansen here today for:     Right Ring Wrist/ Hand pain:   Location: PIP joint   Duration: 4 weeks   Trauma/ Fall? Punched desk    Swelling? A little    Numbness/ Tingling? No    Weakness? Yes    Imaging? Yes    Treatment? Splint        Per Dr Christiansen note:  Closed displaced fracture of proximal phalanx of right ring finger with routine healing, subsequent encounter  Has comminuted fracture with mild displacement. Should have follow up with hand specialist as it is his dominant hand. It is reasonable to repeat xray today and pending result hand specialist referral. He understood.   - XR Finger Right G/E 2 Views; Future  Ortho  referral.       No past medical history on file.   No past surgical history on file.   Current Outpatient Medications   Medication Sig Dispense Refill     amphetamine-dextroamphetamine (ADDERALL XR) 15 MG 24 hr capsule Take 1 capsule (15 mg) by mouth daily 30 capsule 0     [START ON 4/14/2022] amphetamine-dextroamphetamine (ADDERALL XR) 15 MG 24 hr capsule Take 1 capsule (15 mg) by mouth daily 30 capsule 0     [START ON 5/15/2022] amphetamine-dextroamphetamine (ADDERALL XR) 15 MG 24 hr capsule Take 1 capsule (15 mg) by mouth daily 30 capsule 0      Allergies   Allergen Reactions     Bees Hives      ROS:   Gen- no fevers/chills   Rheum - no morning stiffness   Derm - no rash/ redness   Neuro - no numbness, no tingling    Remainder of ROS negative.     Exam:   There were no vitals taken for this visit.       R WRIST/HAND:   Inspection: Swelling - YES - 4th digit; Atrophy - NO   Sensation: intact in median, radial, ulnar distribution   ROM:   Hand: Limited flexion at PIP of ring finger.   Strength: deferred  Bony tenderness:   Hand: Metacarpals: No; Phalanges: non-tender at fx site   Tendons: FDS/FDP/Extensor mechanism intact   Maneuvers: deferred    [place] [facility] [place] transverse fx of proximal phalanx w/ callus      Marco A Doe MD

## 2022-03-22 NOTE — LETTER
3/22/2022      RE: Johan Cole  [address] [address]       ASSESSMENT/PLAN:    (S62.025A) Closed displaced fracture of proximal phalanx of right ring finger with routine healing, subsequent encounter  Comment: repeat xray w/ stable fx and evidence of bony healing; clinically no pain at fx site; will transition out of splint to bobby taping and get him into hand therapy for ROM; f/u prn   Plan: XR Finger Right G/E 2 Views, Hand Therapy Referral          Marco A Doe MD  March 22, 2022  1:50 PM        Pt is a 37 year old male referred by Dr Christiansen here today for:     Right Ring Wrist/ Hand pain:   Location: PIP joint   Duration: 4 weeks   Trauma/ Fall? Punched desk    Swelling? A little    Numbness/ Tingling? No    Weakness? Yes    Imaging? Yes    Treatment? Splint        Per Dr Christiansen note:  Closed displaced fracture of proximal phalanx of right ring finger with routine healing, subsequent encounter  Has comminuted fracture with mild displacement. Should have follow up with hand specialist as it is his dominant hand. It is reasonable to repeat xray today and pending result hand specialist referral. He understood.   - XR Finger Right G/E 2 Views; Future  Ortho  referral.       No past medical history on file.   No past surgical history on file.   Current Outpatient Medications   Medication Sig Dispense Refill     amphetamine-dextroamphetamine (ADDERALL XR) 15 MG 24 hr capsule Take 1 capsule (15 mg) by mouth daily 30 capsule 0     [START ON 4/14/2022] amphetamine-dextroamphetamine (ADDERALL XR) 15 MG 24 hr capsule Take 1 capsule (15 mg) by mouth daily 30 capsule 0     [START ON 5/15/2022] amphetamine-dextroamphetamine (ADDERALL XR) 15 MG 24 hr capsule Take 1 capsule (15 mg) by mouth daily 30 capsule 0      Allergies   Allergen Reactions     Bees Hives      ROS:   Gen- no fevers/chills   Rheum - no morning stiffness   Derm - no rash/ redness   Neuro - no numbness, no tingling    Remainder of ROS negative.     Exam:   There were no vitals taken for this visit.       R WRIST/HAND:   Inspection: Swelling - YES - 4th digit; Atrophy - NO   Sensation: intact in median, radial, ulnar distribution   ROM:   Hand: Limited flexion at PIP of ring finger.   Strength: deferred  Bony tenderness:   Hand: Metacarpals: No; Phalanges: non-tender at fx site   Tendons: FDS/FDP/Extensor mechanism intact   Maneuvers: deferred    [place] [facility] [place] fx of proximal phalanx w/ callus      Marco A Doe MD

## 2022-03-23 ENCOUNTER — THERAPY VISIT (OUTPATIENT)
Dept: OCCUPATIONAL THERAPY | Facility: CLINIC | Age: 37
End: 2022-03-23
Attending: FAMILY MEDICINE
Payer: COMMERCIAL

## 2022-03-23 DIAGNOSIS — S62.614D CLOSED DISPLACED FRACTURE OF PROXIMAL PHALANX OF RIGHT RING FINGER WITH ROUTINE HEALING, SUBSEQUENT ENCOUNTER: ICD-10-CM

## 2022-03-23 DIAGNOSIS — M79.644 PAIN OF FINGER OF RIGHT HAND: Primary | ICD-10-CM

## 2022-03-23 PROCEDURE — 97165 OT EVAL LOW COMPLEX 30 MIN: CPT | Mod: GO | Performed by: OCCUPATIONAL THERAPIST

## 2022-03-23 PROCEDURE — 97530 THERAPEUTIC ACTIVITIES: CPT | Mod: GO | Performed by: OCCUPATIONAL THERAPIST

## 2022-03-23 PROCEDURE — 97110 THERAPEUTIC EXERCISES: CPT | Mod: GO | Performed by: OCCUPATIONAL THERAPIST

## 2022-03-23 NOTE — PROGRESS NOTES
Hand Therapy Initial Evaluation    Current Date:  3/23/2022    Diagnosis: closed displaced fracture of proximal phalanx of right ring finger  DOI: 2/21/22  Post:  4w 2d    Precautions: transition out of splint to bobby taping, work on ROM with hand therapy    Subjective:  Johan Cole is a 37 year old male.    Patient reports symptoms of the right ring finger.  Since onset symptoms are Gradually getting better.  General health as reported by patient is good.  Pertinent medical history includes: See electronic medical record  Medical allergies: none.  Surgical history: none pertinent to the UEs; See electronic medical record Medication history: See electronic medical record    Current occupation   Job Tasks: Computer Work, Lifting, Carrying, Operating a Machine, Assembly, Prolonged Standing, Pushing, Pulling    Occupational Profile Information:  Right hand dominant  Prior functional level:  no limitations  Patient reports symptoms of pain, stiffness/loss of motion and edema  Special tests:  x-ray.    Previous treatment: OTC splint  Barriers include:none  Mobility: No difficulty  Transportation: drives  Currently working in normal job without restrictions  Leisure activities/hobbies: does a bit of bouldering    Functional Outcome Measure:   Upper Extremity Functional Index Score:  SCORE:   Column Totals: /80: 51   (A lower score indicates greater disability.)      Objective:  Pain Level (Scale 0-10)   3/23/2022   At Rest 0   With Use 3 with bumping it     Pain Description  Date 3/23/2022   Location Radial side of PIPj of the RF   Pain Quality Sharp when bumped and a bit achy, due to use of splint   Frequency intermittent     Pain is worst  daytime   Exacerbated by  bumping it   Relieved by rest   Progression miproving     Edema  Mild    Sensation   WNL throughout all nerve distributions; per patient report    ROM  ring Finger 3/23/2022 3/23/2022   AROM (PROM) R L   MCP 0/77 HE/92   PIP 10/58 0/89    DIP 0/36 0/80   WAGNER       Strength  Testing deferred    Assessment:  Patient presents with symptoms consistent with diagnosis of right ring finger fx,  with non-surgical intervention.     Patient's limitations or Problem List includes: pain, weakness, edema, decreased ROM of the right ring finger which interferes with the patient's ability to perform ADLs and instrumental activities of daily living as compared to previous level of function.    Rehab Potential:  Excellent, expect return to normal activities.    Patient will benefit from skilled Occupational Therapy to increase ROM and decrease pain, to return to previous activity level and resume normal daily tasks and to reach their rehab potential.    Barriers to Learning:  no barriers    Communication Issues:  Patient appears to be able to clearly communicate and understand verbal and written communication and follow directions correctly.    Chart Review: Brief history including review of medical and/or therapy records relating to the presenting problem and Simple history review with patient    Identified Performance Deficits: work, self cares and home establishment and management.  Assessment of Occupational Performance:  3-5 Performance Deficits    Clinical Decision Making (Complexity): Low complexity    Treatment Explanation:  The following has been discussed with the patient:  RX ordered/plan of care  Anticipated outcomes  Possible risks and side effects    Plan:  Frequency:  1 X week, once daily  Duration:  for 1 weeks    Treatment Plan:   Therapeutic Exercise:  AROM  Self Care:  Self Care Tasks    Discharge Plan:  Achieve all LTG.  Independent in home treatment program.  Reach maximal therapeutic benefit.

## 2022-05-31 ENCOUNTER — OFFICE VISIT (OUTPATIENT)
Dept: FAMILY MEDICINE | Facility: CLINIC | Age: 37
End: 2022-05-31
Payer: COMMERCIAL

## 2022-05-31 VITALS
HEART RATE: 96 BPM | RESPIRATION RATE: 20 BRPM | HEIGHT: 70 IN | OXYGEN SATURATION: 98 % | DIASTOLIC BLOOD PRESSURE: 80 MMHG | BODY MASS INDEX: 32.21 KG/M2 | TEMPERATURE: 97 F | SYSTOLIC BLOOD PRESSURE: 110 MMHG | WEIGHT: 225 LBS

## 2022-05-31 DIAGNOSIS — F90.0 ATTENTION DEFICIT HYPERACTIVITY DISORDER (ADHD), PREDOMINANTLY INATTENTIVE TYPE: Primary | ICD-10-CM

## 2022-05-31 PROCEDURE — 0054A COVID-19,PF,PFIZER (12+ YRS): CPT | Performed by: FAMILY MEDICINE

## 2022-05-31 PROCEDURE — 96127 BRIEF EMOTIONAL/BEHAV ASSMT: CPT | Mod: 59 | Performed by: FAMILY MEDICINE

## 2022-05-31 PROCEDURE — 91305 COVID-19,PF,PFIZER (12+ YRS): CPT | Performed by: FAMILY MEDICINE

## 2022-05-31 PROCEDURE — 99213 OFFICE O/P EST LOW 20 MIN: CPT | Mod: 25 | Performed by: FAMILY MEDICINE

## 2022-05-31 RX ORDER — DEXTROAMPHETAMINE SACCHARATE, AMPHETAMINE ASPARTATE MONOHYDRATE, DEXTROAMPHETAMINE SULFATE AND AMPHETAMINE SULFATE 3.75; 3.75; 3.75; 3.75 MG/1; MG/1; MG/1; MG/1
15 CAPSULE, EXTENDED RELEASE ORAL DAILY
Qty: 30 CAPSULE | Refills: 0 | Status: SHIPPED | OUTPATIENT
Start: 2022-07-14 | End: 2022-08-13

## 2022-05-31 RX ORDER — DEXTROAMPHETAMINE SACCHARATE, AMPHETAMINE ASPARTATE MONOHYDRATE, DEXTROAMPHETAMINE SULFATE AND AMPHETAMINE SULFATE 3.75; 3.75; 3.75; 3.75 MG/1; MG/1; MG/1; MG/1
15 CAPSULE, EXTENDED RELEASE ORAL DAILY
Qty: 30 CAPSULE | Refills: 0 | Status: SHIPPED | OUTPATIENT
Start: 2022-06-14 | End: 2022-07-14

## 2022-05-31 RX ORDER — DEXTROAMPHETAMINE SACCHARATE, AMPHETAMINE ASPARTATE MONOHYDRATE, DEXTROAMPHETAMINE SULFATE AND AMPHETAMINE SULFATE 3.75; 3.75; 3.75; 3.75 MG/1; MG/1; MG/1; MG/1
15 CAPSULE, EXTENDED RELEASE ORAL DAILY
Qty: 30 CAPSULE | Refills: 0 | Status: SHIPPED | OUTPATIENT
Start: 2022-08-13 | End: 2022-09-14 | Stop reason: DRUGHIGH

## 2022-05-31 ASSESSMENT — ANXIETY QUESTIONNAIRES
5. BEING SO RESTLESS THAT IT IS HARD TO SIT STILL: SEVERAL DAYS
3. WORRYING TOO MUCH ABOUT DIFFERENT THINGS: SEVERAL DAYS
7. FEELING AFRAID AS IF SOMETHING AWFUL MIGHT HAPPEN: SEVERAL DAYS
GAD7 TOTAL SCORE: 6
GAD7 TOTAL SCORE: 6
6. BECOMING EASILY ANNOYED OR IRRITABLE: SEVERAL DAYS
IF YOU CHECKED OFF ANY PROBLEMS ON THIS QUESTIONNAIRE, HOW DIFFICULT HAVE THESE PROBLEMS MADE IT FOR YOU TO DO YOUR WORK, TAKE CARE OF THINGS AT HOME, OR GET ALONG WITH OTHER PEOPLE: SOMEWHAT DIFFICULT
2. NOT BEING ABLE TO STOP OR CONTROL WORRYING: SEVERAL DAYS
1. FEELING NERVOUS, ANXIOUS, OR ON EDGE: NOT AT ALL

## 2022-05-31 ASSESSMENT — PATIENT HEALTH QUESTIONNAIRE - PHQ9
SUM OF ALL RESPONSES TO PHQ QUESTIONS 1-9: 5
5. POOR APPETITE OR OVEREATING: SEVERAL DAYS

## 2022-05-31 NOTE — PROGRESS NOTES
"  Assessment & Plan     Attention deficit hyperactivity disorder (ADHD), predominantly inattentive type  Feeling better with it. PDMP checked. Vitals stable.   - amphetamine-dextroamphetamine (ADDERALL XR) 15 MG 24 hr capsule; Take 1 capsule (15 mg) by mouth daily  - amphetamine-dextroamphetamine (ADDERALL XR) 15 MG 24 hr capsule; Take 1 capsule (15 mg) by mouth daily  - amphetamine-dextroamphetamine (ADDERALL XR) 15 MG 24 hr capsule; Take 1 capsule (15 mg) by mouth daily             BMI:   Estimated body mass index is 32.03 kg/m  as calculated from the following:    Height as of this encounter: 1.785 m (5' 10.28\").    Weight as of this encounter: 102.1 kg (225 lb).           Return in about 3 months (around 8/31/2022). virtual visit is fine at that time.     Tay Christiansen MD, MD  [facility]    Javier Lima is a 37 year old who presents for the following health issues  accompanied by his alone.  2  A.D.H.D    History of Present Illness       Reason for visit:  Medication review    He eats 0-1 servings of fruits and vegetables daily.He consumes 1 sweetened beverage(s) daily.He exercises with enough effort to increase his heart rate 20 to 29 minutes per day.  He exercises with enough effort to increase his heart rate 4 days per week. He is missing 2 dose(s) of medications per week.  He is not taking prescribed medications regularly due to other.     Medication for ADHD - feels it is helping.   For certain issues - driving feels calm, more fluid conversation, work is fine.  Overall feels positive impact.   No major side effects - working on diet and stomach issue may be related to that.   Sleep - no issue.   Skipping on weekend most of the time.   No palpitation.   No major Anxiety. Has some baseline [place].   Had a dose this morning.   Feels dose is OK.     Review of Systems         Objective    /80 (BP Location: Right arm, Patient Position: " "Sitting, Cuff Size: Adult Regular)   Pulse 96   Temp 97  F (36.1  C) (Temporal)   Resp 20   Ht 1.785 m (5' 10.28\")   Wt 102.1 kg (225 lb)   SpO2 98%   BMI 32.03 kg/m    Body mass index is 32.03 kg/m .  Physical Exam     Skin - multiple lesions on both hands from skin picking.             "

## 2022-06-14 PROBLEM — M79.644 PAIN OF FINGER OF RIGHT HAND: Status: RESOLVED | Noted: 2022-03-23 | Resolved: 2022-06-14

## 2022-07-10 ENCOUNTER — HEALTH MAINTENANCE LETTER (OUTPATIENT)
Age: 37
End: 2022-07-10

## 2022-09-06 ENCOUNTER — TELEPHONE (OUTPATIENT)
Dept: FAMILY MEDICINE | Facility: CLINIC | Age: 37
End: 2022-09-06

## 2022-09-06 ENCOUNTER — OFFICE VISIT (OUTPATIENT)
Dept: FAMILY MEDICINE | Facility: CLINIC | Age: 37
End: 2022-09-06
Payer: COMMERCIAL

## 2022-09-06 VITALS
DIASTOLIC BLOOD PRESSURE: 78 MMHG | HEIGHT: 74 IN | TEMPERATURE: 97.5 F | BODY MASS INDEX: 29.36 KG/M2 | HEART RATE: 93 BPM | SYSTOLIC BLOOD PRESSURE: 122 MMHG | OXYGEN SATURATION: 97 % | WEIGHT: 228.8 LBS | RESPIRATION RATE: 15 BRPM

## 2022-09-06 DIAGNOSIS — F90.0 ATTENTION DEFICIT HYPERACTIVITY DISORDER (ADHD), PREDOMINANTLY INATTENTIVE TYPE: Primary | ICD-10-CM

## 2022-09-06 PROCEDURE — 90686 IIV4 VACC NO PRSV 0.5 ML IM: CPT | Performed by: FAMILY MEDICINE

## 2022-09-06 PROCEDURE — 90471 IMMUNIZATION ADMIN: CPT | Performed by: FAMILY MEDICINE

## 2022-09-06 PROCEDURE — 99214 OFFICE O/P EST MOD 30 MIN: CPT | Mod: 25 | Performed by: FAMILY MEDICINE

## 2022-09-06 RX ORDER — DEXTROAMPHETAMINE SACCHARATE, AMPHETAMINE ASPARTATE MONOHYDRATE, DEXTROAMPHETAMINE SULFATE AND AMPHETAMINE SULFATE 5; 5; 5; 5 MG/1; MG/1; MG/1; MG/1
20 CAPSULE, EXTENDED RELEASE ORAL DAILY
Qty: 7 CAPSULE | Refills: 0 | Status: SHIPPED | OUTPATIENT
Start: 2022-09-06 | End: 2022-09-14

## 2022-09-06 NOTE — TELEPHONE ENCOUNTER
Prior Authorization Retail Medication Request    Medication/Dose: amphetamine-dextroamphetamine (ADDERALL XR) 20 MG 24 hr capsule  ICD code (if different than what is on RX):  Previously Tried and Failed:  Rationale:    Insurance Name:   Insurance ID: [id number]    Pharmacy Information (if different than what is on RX)  Name:  Phone:    Please include previous medications tried and failed.  Please ask insurance for medications on formulary.

## 2022-09-06 NOTE — PROGRESS NOTES
[facility]  [address] [place] [address], [address], [place] [address]  Phone: [phone] / Fax: [phone]    RHEUMATOLOGY NEW PATIENT VISIT NOTE      DATE OF SERVICE: 07/25/2022    REFERRING PROVIDER:  [place] [address]      Assessment & Plan     Attention deficit hyperactivity disorder (ADHD), predominantly inattentive type  Still some ADHD symptoms.  We discussed we can go up on the dose of 20 mg and see how he does.  He is concerned about possible side effect including worsening of headaches and anxiety.  We discussed trial of 7 days of 20 mg dose and if he is tolerating it well okay to notify me via [employer] and I can refill 3 months of Rx.  If not, we will go back on 15 mg dose.  He understood. Ok to refill 3 months of rx pending his [employer] message.   - amphetamine-dextroamphetamine (ADDERALL XR) 20 MG 24 hr capsule; Take 1 capsule (20 mg) by mouth daily       BMI:   Estimated body mass index is 29.38 kg/m  as calculated from the following:    Height as of this encounter: 1.88 m (6' 2\").    Weight as of this encounter: 103.8 kg (228 lb 12.8 oz).   Weight management plan: Discussed healthy diet and exercise guidelines        Return in about 3 months (around 12/6/2022).    Tay Christiansen MD, MD  [facility]    Javier Lima is a 37 year old, presenting for the following health issues:  Recheck Medication (ADHD)      History of Present Illness       Reason for visit:  Prescription effectiveness/analysis    He eats 2-3 servings of fruits and vegetables daily.He consumes 2 sweetened beverage(s) daily.He exercises with enough effort to increase his heart rate 20 to 29 minutes per day.  He exercises with enough effort to increase his heart rate 4 days per week.   He is taking medications regularly.     Calmer, slowed down,  Able to focus on details.  Able to focus on priorities.     Occasional headache, still loses track of time in the morning.  Some chores and projects in house - still not finished. It is still working ok and admits it may not be perfect ever and not too excited about going up on the dose.          Review of Systems         Objective    /78 (BP Location: Left arm, Patient " "Position: Sitting, Cuff Size: Adult Large)   Pulse 93   Temp 97.5  F (36.4  C) (Temporal)   Resp 15   Ht 1.88 m (6' 2\")   Wt 103.8 kg (228 lb 12.8 oz)   SpO2 97%   BMI 29.38 kg/m    Body mass index is 29.38 kg/m .  Physical Exam   Some erythematous changes and excoriation on both upper and lower extremities from skin picking.                     [unfilled]  @[facility]@  " "Problem List   Diagnosis   • Positive ELVIS (Sophia Wynne.    Vishal Lawson MD, MS  Rheumatologist, [facility]  , Department of Internal Medicine  [facility]

## 2022-09-07 NOTE — TELEPHONE ENCOUNTER
Central Prior Authorization Team   Phone: [phone]      Prior Authorization Approval    Authorization Effective Date: 9/7/2022  Authorization Expiration Date: 12/31/2099  Medication: amphetamine-dextroamphetamine (ADDERALL XR) 20 MG 24 hr capsule--APPROVED  Approved Dose/Quantity:    Reference #:     Insurance Company: [employer] - Phone [phone] Fax [phone]  Expected CoPay:       CoPay Card Available:      Foundation Assistance Needed:    Which Pharmacy is filling the prescription (Not needed for infusion/clinic administered): [id number] - [place], [place] - [address]  Pharmacy Notified: Yes  Patient Notified: Yes PHARMACY WILL CONTACT WHEN FILLED

## 2022-09-17 ENCOUNTER — HOSPITAL ENCOUNTER (EMERGENCY)
Facility: CLINIC | Age: 37
Discharge: HOME | End: 2022-09-17
Attending: EMERGENCY MEDICINE | Admitting: EMERGENCY MEDICINE
Payer: COMMERCIAL

## 2022-09-17 ENCOUNTER — APPOINTMENT (OUTPATIENT)
Dept: CT IMAGING | Facility: CLINIC | Age: 37
End: 2022-09-17
Attending: EMERGENCY MEDICINE
Payer: COMMERCIAL

## 2022-09-17 ENCOUNTER — OFFICE VISIT (OUTPATIENT)
Dept: URGENT CARE | Facility: URGENT CARE | Age: 37
End: 2022-09-17
Payer: COMMERCIAL

## 2022-09-17 VITALS
OXYGEN SATURATION: 100 % | SYSTOLIC BLOOD PRESSURE: 149 MMHG | HEART RATE: 97 BPM | BODY MASS INDEX: 29.53 KG/M2 | TEMPERATURE: 98.7 F | RESPIRATION RATE: 16 BRPM | DIASTOLIC BLOOD PRESSURE: 104 MMHG | WEIGHT: 230 LBS

## 2022-09-17 VITALS
OXYGEN SATURATION: 97 % | SYSTOLIC BLOOD PRESSURE: 139 MMHG | HEART RATE: 80 BPM | DIASTOLIC BLOOD PRESSURE: 90 MMHG | TEMPERATURE: 98.4 F

## 2022-09-17 DIAGNOSIS — R07.9 CHEST PAIN, UNSPECIFIED TYPE: ICD-10-CM

## 2022-09-17 DIAGNOSIS — R03.0 ELEVATED BLOOD PRESSURE READING WITHOUT DIAGNOSIS OF HYPERTENSION: ICD-10-CM

## 2022-09-17 DIAGNOSIS — J90 PLEURAL EFFUSION: ICD-10-CM

## 2022-09-17 DIAGNOSIS — R07.89 CHEST DISCOMFORT: Primary | ICD-10-CM

## 2022-09-17 DIAGNOSIS — R09.1 PLEURISY: ICD-10-CM

## 2022-09-17 LAB
BASOPHILS # BLD AUTO: 0 10E3/UL (ref 0–0.2)
BASOPHILS NFR BLD AUTO: 0 %
CRP SERPL-MCNC: 13.6 MG/L
D DIMER PPP FEU-MCNC: 0.55 UG/ML FEU (ref 0–0.5)
EOSINOPHIL # BLD AUTO: 0.3 10E3/UL (ref 0–0.7)
EOSINOPHIL NFR BLD AUTO: 3 %
ERYTHROCYTE [DISTWIDTH] IN BLOOD BY AUTOMATED COUNT: 13.4 % (ref 10–15)
HCT VFR BLD AUTO: 42.5 % (ref 40–53)
HGB BLD-MCNC: 14 G/DL (ref 13.3–17.7)
IMM GRANULOCYTES # BLD: 0 10E3/UL
IMM GRANULOCYTES NFR BLD: 0 %
LYMPHOCYTES # BLD AUTO: 2.3 10E3/UL (ref 0.8–5.3)
LYMPHOCYTES NFR BLD AUTO: 25 %
MCH RBC QN AUTO: 27.5 PG (ref 26.5–33)
MCHC RBC AUTO-ENTMCNC: 32.9 G/DL (ref 31.5–36.5)
MCV RBC AUTO: 84 FL (ref 78–100)
MONOCYTES # BLD AUTO: 0.7 10E3/UL (ref 0–1.3)
MONOCYTES NFR BLD AUTO: 7 %
NEUTROPHILS # BLD AUTO: 5.9 10E3/UL (ref 1.6–8.3)
NEUTROPHILS NFR BLD AUTO: 64 %
PLATELET # BLD AUTO: 346 10E3/UL (ref 150–450)
RBC # BLD AUTO: 5.09 10E6/UL (ref 4.4–5.9)
TROPONIN I SERPL HS-MCNC: <3 NG/L
WBC # BLD AUTO: 9.2 10E3/UL (ref 4–11)

## 2022-09-17 PROCEDURE — 71275 CT ANGIOGRAPHY CHEST: CPT

## 2022-09-17 PROCEDURE — 93000 ELECTROCARDIOGRAM COMPLETE: CPT | Performed by: FAMILY MEDICINE

## 2022-09-17 PROCEDURE — 250N000011 HC RX IP 250 OP 636: Performed by: EMERGENCY MEDICINE

## 2022-09-17 PROCEDURE — 85025 COMPLETE CBC W/AUTO DIFF WBC: CPT | Performed by: FAMILY MEDICINE

## 2022-09-17 PROCEDURE — 99215 OFFICE O/P EST HI 40 MIN: CPT | Performed by: FAMILY MEDICINE

## 2022-09-17 PROCEDURE — 84484 ASSAY OF TROPONIN QUANT: CPT | Performed by: FAMILY MEDICINE

## 2022-09-17 PROCEDURE — 36415 COLL VENOUS BLD VENIPUNCTURE: CPT | Performed by: FAMILY MEDICINE

## 2022-09-17 PROCEDURE — 85379 FIBRIN DEGRADATION QUANT: CPT | Performed by: FAMILY MEDICINE

## 2022-09-17 PROCEDURE — 250N000009 HC RX 250: Performed by: EMERGENCY MEDICINE

## 2022-09-17 PROCEDURE — 99285 EMERGENCY DEPT VISIT HI MDM: CPT | Mod: 25

## 2022-09-17 PROCEDURE — 86140 C-REACTIVE PROTEIN: CPT | Performed by: FAMILY MEDICINE

## 2022-09-17 RX ORDER — IOPAMIDOL 755 MG/ML
63 INJECTION, SOLUTION INTRAVASCULAR ONCE
Status: COMPLETED | OUTPATIENT
Start: 2022-09-17 | End: 2022-09-17

## 2022-09-17 RX ADMIN — SODIUM CHLORIDE 88 ML: 900 INJECTION INTRAVENOUS at 20:07

## 2022-09-17 RX ADMIN — IOPAMIDOL 63 ML: 755 INJECTION, SOLUTION INTRAVENOUS at 20:07

## 2022-09-17 ASSESSMENT — ACTIVITIES OF DAILY LIVING (ADL): ADLS_ACUITY_SCORE: 35

## 2022-09-17 ASSESSMENT — ENCOUNTER SYMPTOMS
SHORTNESS OF BREATH: 1
LIGHT-HEADEDNESS: 1
FEVER: 0
NAUSEA: 1
ABDOMINAL PAIN: 0

## 2022-09-17 NOTE — PROGRESS NOTES
Assessment & Plan     Chest discomfort  - CRP, inflammation  - Troponin I  - CBC with platelets and differential  - D dimer, quantitative  - CRP, inflammation  - Troponin I  - CBC with platelets and differential  - D dimer, quantitative  - EKG 12-lead complete w/read - Clinics    Patient would like to avoid the ED at this time -- he has normal mentation as in no distress. Given his vague symptoms of chest discomfort I did request we pursue screening to rule out NSTEMI and PE.   Xray imaging is NOT available at our clinic today due to staffing issues - but he reports no cough or fever. There should be consideration for bruised or broken rib given recent sports activity.     Was very clear to patient he needs to be available by phone in case his results return positive although labs ordered STAT    Low suspicion for aortic dissection given risk factors/current presentation.     Elevated blood pressure reading without diagnosis of hypertension  Improved from 109 diastolic to 90 diastolic towards the end of the visit. Continue to monitor going forward      50 minutes spent on the date of the encounter doing chart review, history and exam, documentation and further activities per the note.         Hector Vazquez MD   [facility]    Javier Lima is a 37 year old male who presents to clinic today for the following health issues:  Chief Complaint   Patient presents with     Urgent Care     Sleep Apnea     Chest Pain     It started on 09/14     Breathing Problem     Light headed, with a little nausea      HPI  Starting 3 days ago sometimes worse with position. With walking he may notice symptoms more. Took ibuprofen first time yesterday he reports this may have helped.   Plays softball has taken some dives landing forwards onto chest.   Denies cough/fever    No recent illnesses  Denies COVID in last 6 months    no use of illicit drugs including cocaine    Denies FH of early heart attack or blood  clots/pulmonary embolism    Uncertain but he thinks many years ago he was diagnosed with a small peripheral blood clot in right lower leg he was not on blood thinners for this.     Has had GERD in the past says this feels different    He denies constipation. No abdominal pain.     Adderrall dose increased by 5mg recently  Patient Active Problem List    Diagnosis Date Noted     Mild recurrent major depression (H) 02/15/2022     Priority: Medium     Attention deficit hyperactivity disorder (ADHD), predominantly inattentive type 02/15/2022     Priority: Medium     Skin-picking disorder 10/16/2019     Priority: Medium     Gastroesophageal reflux disease, esophagitis presence not specified 05/08/2018     Priority: Medium     IMO Regulatory Load OCT 2020       Bee sting allergy 12/13/2016     Priority: Medium     CARDIOVASCULAR SCREENING; LDL GOAL LESS THAN 160 10/21/2013     Priority: Medium       Current Outpatient Medications   Medication     amphetamine-dextroamphetamine (ADDERALL XR) 20 MG 24 hr capsule     [START ON 10/15/2022] amphetamine-dextroamphetamine (ADDERALL XR) 20 MG 24 hr capsule     [START ON 11/15/2022] amphetamine-dextroamphetamine (ADDERALL XR) 20 MG 24 hr capsule     No current facility-administered medications for this visit.         Objective    BP (!) 139/90   Pulse 80   Temp 98.4  F (36.9  C) (Temporal)   SpO2 97%   Physical Exam   GEN: normal mentation , no distress  CV: no m/r/g, sinus, normal rate  Pulm: non-labored, good aeration, clear    EKG: sinus, normal rate, no ST-T abnormalities  Results for orders placed or performed in visit on 09/17/22   CBC with platelets and differential     Status: None   Result Value Ref Range    WBC Count 9.2 4.0 - 11.0 10e3/uL    RBC Count 5.09 4.40 - 5.90 10e6/uL    Hemoglobin 14.0 13.3 - 17.7 g/dL    Hematocrit 42.5 40.0 - 53.0 %    MCV 84 78 - 100 fL    MCH 27.5 26.5 - 33.0 pg    MCHC 32.9 31.5 - 36.5 g/dL    RDW 13.4 10.0 - 15.0 %    Platelet Count 346  150 - 450 10e3/uL    % Neutrophils 64 %    % Lymphocytes 25 %    % Monocytes 7 %    % Eosinophils 3 %    % Basophils 0 %    % Immature Granulocytes 0 %    Absolute Neutrophils 5.9 1.6 - 8.3 10e3/uL    Absolute Lymphocytes 2.3 0.8 - 5.3 10e3/uL    Absolute Monocytes 0.7 0.0 - 1.3 10e3/uL    Absolute Eosinophils 0.3 0.0 - 0.7 10e3/uL    Absolute Basophils 0.0 0.0 - 0.2 10e3/uL    Absolute Immature Granulocytes 0.0 <=0.4 10e3/uL   CBC with platelets and differential     Status: None    Narrative    The following orders were created for panel order CBC with platelets and differential.  Procedure                               Abnormality         Status                     ---------                               -----------         ------                     CBC with platelets and d...[[id number]]                      Final result                 Please view results for these tests on the individual orders.               The use of Dragon/[employer] Software[employer] services may have been used to construct the content in this note; any grammatical or spelling errors are non-intentional. Please contact the author of this note directly if you are in need of any clarification.

## 2022-09-17 NOTE — PATIENT INSTRUCTIONS
If your symptoms worsen at any point go to the emergency room right away      If no improvement in your chest discomfort in 2 days return to be evaluated, we may need to consider an xray of your chest      Stay by your phone: if your D-Dimer or Troponin test (to screen for blood clots/heart stress) returns abnormal/positive you will need to go to the emergency room right away      Continue ibuprofen 400-600mg every 4 hours as needed for discomfort

## 2022-09-18 NOTE — ED PROVIDER NOTES
History   Chief Complaint:  Chest Pain       HPI   Johan Cole is a 37 year old male who presents with constant low left chest pain for the past 2 days. Today he developed shortness of breath that worsens when he breathes deaply as well. He describes the pain as sharp, heavy, and dull. It moves to his upper chest and back when he leans back. Two days ago he woke up with lightheadedness and nausea. He presented to  first and blood work was done. He had an elevated Ddimer, and therefore he was sent here. He has never had this before. He denies fever or abdominal pain. He is not a smoker. He denies family history of blood clots or cardiac disease at a young age.     Review of Systems   Constitutional: Negative for fever.   Respiratory: Positive for shortness of breath.    Cardiovascular: Positive for chest pain.   Gastrointestinal: Positive for nausea. Negative for abdominal pain.   Neurological: Positive for light-headedness.   All other systems reviewed and are negative.        Allergies:  Bees    Medications:  Adderall    Past Medical History:     GERD  Depression  ADHD    Family History:    Hernia    Social History:  The patient presents to the ED alone  PCP: Tay Christiansen     Physical Exam     Patient Vitals for the past 24 hrs:   BP Temp Temp src Pulse Resp SpO2 Weight   09/17/22 1929 (!) 149/104 98.7  F (37.1  C) Temporal 97 16 100 % 104.3 kg (230 lb)       Physical Exam  Nursing note and vitals reviewed.  Constitutional:  Oriented to person, place, and time. Cooperative.   HENT:   Nose:    Nose normal.   Mouth/Throat:   Mucous membranes are normal.   Eyes:    Conjunctivae normal and EOM are normal.      Pupils are equal, round, and reactive to light.   Neck:    Trachea normal.   Cardiovascular:  Normal rate, regular rhythm, normal heart sounds and normal pulses. No murmur heard.  Pulmonary/Chest:  Effort normal and breath sounds normal.   Abdominal:   Soft. Normal appearance and bowel sounds  are normal.      There is no tenderness.      There is no rebound and no CVA tenderness.   Musculoskeletal:  Extremities atraumatic x 4.   Lymphadenopathy:  No cervical adenopathy.   Neurological:   Alert and oriented to person, place, and time. Normal strength.      No cranial nerve deficit or sensory deficit. GCS eye subscore is 4. GCS verbal subscore is 5. GCS motor subscore is 6.   Skin:    Scattered small excoriations to his upper extremities.   Psychiatric:   Normal mood and affect.    Emergency Department Course     Imaging:  CT Chest Pulmonary Embolism w Contrast   Final Result   IMPRESSION:   1.  No evidence of pulmonary embolus.   2.  Trace left pleural effusion of uncertain etiology. Otherwise negative chest CT.        Report per radiology    Emergency Department Course:     Reviewed:  I reviewed nursing notes, vitals, past medical history and Care Everywhere    Assessments:  2002 I obtained history and examined the patient as noted above.     2037 I rechecked the patient and explained findings.     Disposition:  The patient was discharged to home.     Impression & Plan     Medical Decision Making:  This is a 37-year-old male who was sent here from urgent care due to an elevated D-dimer there.  He had an EKG and blood work there, including a troponin, all of which were unremarkable.  He had the CT scan obtained here, which shows a very small pleural effusion, which is likely the cause of his symptoms.  He likely has pleurisy which is also likely the cause of the small pleural effusion.  I reassured him that this is not dangerous and should resolve on its own.  I recommended he take Tylenol and ibuprofen and follow-up with his physician.  I also recommended he return here with any concerns or worsening symptoms.      Diagnosis:    ICD-10-CM    1. Chest pain, unspecified type  R07.9    2. Small left pleural effusion  J90    3. Pleurisy  R09.1        Scribe Disclosure:  Salvador ALVA, am serving as  a scribe at 7:41 PM on 9/17/2022 to document services personally performed by Sabino Ely MD based on my observations and the provider's statements to me.          Sabino Ely MD  09/17/22 [id number]

## 2022-09-18 NOTE — ED TRIAGE NOTES
Chest pain since Thursday, the pain was low chest wall with left shift and now it is above his heart non the left side.  Today he is SOB. Was seen by  at 1500 and was told to come here for an CT/xray of his chest. They did blood work and EKG at the . His DD was elevated.      Triage Assessment     Row Name 09/17/22 1927       Triage Assessment (Adult)    Airway WDL WDL       Respiratory WDL    Respiratory WDL X  SOB       Skin Circulation/Temperature WDL    Skin Circulation/Temperature WDL WDL       Cardiac WDL    Cardiac WDL X  chest pain that seems to get stronger since thursday       Peripheral/Neurovascular WDL    Peripheral Neurovascular WDL WDL       Cognitive/Neuro/Behavioral WDL    Cognitive/Neuro/Behavioral WDL WDL

## 2022-12-01 ENCOUNTER — VIRTUAL VISIT (OUTPATIENT)
Dept: FAMILY MEDICINE | Facility: CLINIC | Age: 37
End: 2022-12-01
Payer: COMMERCIAL

## 2022-12-01 DIAGNOSIS — F90.0 ATTENTION DEFICIT HYPERACTIVITY DISORDER (ADHD), PREDOMINANTLY INATTENTIVE TYPE: Primary | ICD-10-CM

## 2022-12-01 DIAGNOSIS — M25.561 CHRONIC PAIN OF RIGHT KNEE: ICD-10-CM

## 2022-12-01 DIAGNOSIS — R03.0 ELEVATED BP WITHOUT DIAGNOSIS OF HYPERTENSION: ICD-10-CM

## 2022-12-01 DIAGNOSIS — G89.29 CHRONIC PAIN OF RIGHT KNEE: ICD-10-CM

## 2022-12-01 PROCEDURE — 99214 OFFICE O/P EST MOD 30 MIN: CPT | Mod: GT | Performed by: FAMILY MEDICINE

## 2022-12-01 RX ORDER — DEXTROAMPHETAMINE SACCHARATE, AMPHETAMINE ASPARTATE MONOHYDRATE, DEXTROAMPHETAMINE SULFATE AND AMPHETAMINE SULFATE 5; 5; 5; 5 MG/1; MG/1; MG/1; MG/1
20 CAPSULE, EXTENDED RELEASE ORAL DAILY
Qty: 30 CAPSULE | Refills: 0 | Status: SHIPPED | OUTPATIENT
Start: 2023-02-12 | End: 2023-01-16

## 2022-12-01 RX ORDER — DEXTROAMPHETAMINE SACCHARATE, AMPHETAMINE ASPARTATE MONOHYDRATE, DEXTROAMPHETAMINE SULFATE AND AMPHETAMINE SULFATE 5; 5; 5; 5 MG/1; MG/1; MG/1; MG/1
20 CAPSULE, EXTENDED RELEASE ORAL DAILY
Qty: 30 CAPSULE | Refills: 0 | Status: SHIPPED | OUTPATIENT
Start: 2023-01-12 | End: 2023-01-16

## 2022-12-01 RX ORDER — DEXTROAMPHETAMINE SACCHARATE, AMPHETAMINE ASPARTATE MONOHYDRATE, DEXTROAMPHETAMINE SULFATE AND AMPHETAMINE SULFATE 5; 5; 5; 5 MG/1; MG/1; MG/1; MG/1
20 CAPSULE, EXTENDED RELEASE ORAL DAILY
Qty: 30 CAPSULE | Refills: 0 | Status: SHIPPED | OUTPATIENT
Start: 2022-12-12 | End: 2023-01-11

## 2022-12-01 NOTE — PATIENT INSTRUCTIONS
Goal blood pressure is below 135/85 - both numbers needs to be below goal.  Check bp daily random times and send me a note with bp readings and also skip adderall for weekend sometime and see if it changes your blood pressure.   Work on lifestyle as you are doing.     For physical therapy - [phone]    Follow up in 3 months - schedule few months in advance as it takes few months to get in.     =======================================  FYI:     System will autorelease results as soon as they are available. I wait for all results to be ready before sending you a comment or message.  Be assured I will review and comment on all of your results as soon as I can.     I am at [facility]  ([phone]) usually Monday, Tuesday and Wednesday.   Messages, evisits, phone calls received on Thursday and Friday may not get responded very urgently but I will try to respond as soon as possible.     2) My schedule has been booking out far in advance.  I apologize for the lack of timely access.  If you need to be seen for a chronic condition or preventive (wellness) visit, please be sure to schedule that appointment 2-3 months in advance.  If you have a concern that you feel cannot wait until my next available appointment (such as a hospital follow-up or new symptom of concern) please ask to speak to one of the [facility] nurses who may be able to access a sooner appointment.      You can schedule a video visit for follow-up appointments as well as future appointments for certain conditions.  Please see the below link.     Video Visits ([email or web address])     If you have not already done so,  I encourage you to sign up for [email or web address] ([email or web address]).  This will allow you to review your results, securely communicate with a provider, and schedule virtual visits as well.

## 2022-12-01 NOTE — PROGRESS NOTES
Clarence is a 37 year old who is being evaluated via a billable video visit.      How would you like to obtain your AVS? [employer]  If the video visit is dropped, the invitation should be resent by: Text to cell phone: [phone]  Will anyone else be joining your video visit? No      Assessment & Plan     Attention deficit hyperactivity disorder (ADHD), predominantly inattentive type  Patient is tolerating current medication without any major side effects of concerns and current dose seems reasonable too.  Current medication regime is effective. Continue current treatment without any changes.   - amphetamine-dextroamphetamine (ADDERALL XR) 20 MG 24 hr capsule; Take 1 capsule (20 mg) by mouth daily for 30 days  - amphetamine-dextroamphetamine (ADDERALL XR) 20 MG 24 hr capsule; Take 1 capsule (20 mg) by mouth daily for 30 days  - amphetamine-dextroamphetamine (ADDERALL XR) 20 MG 24 hr capsule; Take 1 capsule (20 mg) by mouth daily for 30 days    Elevated BP without diagnosis of hypertension  Discussed with stimulant use it is very important to monitor his blood pressure.  He is trying to get blood pressure monitor at home and I recommended him to check's blood pressure at home.  I also recommended him to skip some days stimulant dosages and see if changes is blood pressure.  I will see him in person for vitals monitoring. He can submit his home bp readings via [employer].     Chronic pain of right knee  Affecting ramírez daily activities and it would be reasonable to see physical therapy.  If it is not improving see sports medicine doctor.  - Physical Therapy Referral; Future                 Return in about 3 months (around 3/1/2023).    Tay Christiansen MD, MD  [facility]    Subjective   Clarence is a 37 year old, presenting for the following health issues:  No chief complaint on file.      History of Present Illness       Reason for visit:  Medication follow up/check in    He eats 2-3  servings of fruits and vegetables daily.He consumes 2 sweetened beverage(s) daily.He exercises with enough effort to increase his heart rate 9 or less minutes per day.  He exercises with enough effort to increase his heart rate 3 or less days per week.   He is taking medications regularly.      felt chest pain. Had to go to ER.   Was told to pleurisy.     Blood pressure was high in the ER. Checked at drug store - 130/88.     Been mindful of stimulant. Diet is not great. Exercise - none as knee is not great.  Knee injured for last 6 weeks or so. Right leg.  Going to keep an eye on it.   No known family history of HTN.     Review of Systems         Objective         Vitals:  No vitals were obtained today due to virtual visit.    Physical Exam   GENERAL: Healthy, alert and no distress  EYES: Eyes grossly normal to inspection.  No discharge or erythema, or obvious scleral/conjunctival abnormalities.  RESP: No audible wheeze, cough, or visible cyanosis.  No visible retractions or increased work of breathing.    SKIN: Visible skin clear. No significant rash, abnormal pigmentation or lesions.  NEURO: Cranial nerves grossly intact.  Mentation and speech appropriate for age.  PSYCH: Mentation appears normal, affect normal/bright, judgement and insight intact, normal speech and appearance well-groomed.            Video-Visit Details    Video Start Time: 8:45 AM    Type of service:  Video Visit    Video End Time:9:03 AM    Originating Location (pt. Location): Home        Distant Location (provider location):  Off-site    Platform used for Video Visit: Roland

## 2023-01-11 ENCOUNTER — MYC MEDICAL ADVICE (OUTPATIENT)
Dept: FAMILY MEDICINE | Facility: CLINIC | Age: 38
End: 2023-01-11

## 2023-01-11 DIAGNOSIS — F90.0 ATTENTION DEFICIT HYPERACTIVITY DISORDER (ADHD), PREDOMINANTLY INATTENTIVE TYPE: ICD-10-CM

## 2023-01-11 DIAGNOSIS — Z76.0 ENCOUNTER FOR MEDICATION REFILL: Primary | ICD-10-CM

## 2023-01-13 NOTE — TELEPHONE ENCOUNTER
Patient Returning Call    Reason for call:  Pt found a [facility] that can supply [facility] his Adder[facility] XL 20 mg script.      [facility]-  [address], [place] [address]    Please resend med to [facility] above please.  Thanks    Information relayed to patient:  Will route to PCP    Patient has no questions - just had not heard from PCP    Could we [facility] this [facility] to you [facility] or would you prefer to receive a phone call?:  Yes, please call     Okay to leave a detailed message?: Yes at Home number on file [phone] (home)    Call taken on 1/13/23 at 1227 pm by Ely Lovell CMA [facility]

## 2023-01-16 RX ORDER — DEXTROAMPHETAMINE SACCHARATE, AMPHETAMINE ASPARTATE MONOHYDRATE, DEXTROAMPHETAMINE SULFATE AND AMPHETAMINE SULFATE 5; 5; 5; 5 MG/1; MG/1; MG/1; MG/1
20 CAPSULE, EXTENDED RELEASE ORAL DAILY
Qty: 30 CAPSULE | Refills: 0 | Status: SHIPPED | OUTPATIENT
Start: 2023-01-16 | End: 2023-03-06

## 2023-01-16 RX ORDER — DEXTROAMPHETAMINE SACCHARATE, AMPHETAMINE ASPARTATE MONOHYDRATE, DEXTROAMPHETAMINE SULFATE AND AMPHETAMINE SULFATE 5; 5; 5; 5 MG/1; MG/1; MG/1; MG/1
20 CAPSULE, EXTENDED RELEASE ORAL DAILY
Qty: 30 CAPSULE | Refills: 0 | Status: SHIPPED | OUTPATIENT
Start: 2023-02-12 | End: 2023-01-26

## 2023-01-23 ENCOUNTER — MYC MEDICAL ADVICE (OUTPATIENT)
Dept: FAMILY MEDICINE | Facility: CLINIC | Age: 38
End: 2023-01-23
Payer: COMMERCIAL

## 2023-01-23 DIAGNOSIS — F90.0 ATTENTION DEFICIT HYPERACTIVITY DISORDER (ADHD), PREDOMINANTLY INATTENTIVE TYPE: ICD-10-CM

## 2023-01-26 RX ORDER — DEXTROAMPHETAMINE SACCHARATE, AMPHETAMINE ASPARTATE MONOHYDRATE, DEXTROAMPHETAMINE SULFATE AND AMPHETAMINE SULFATE 5; 5; 5; 5 MG/1; MG/1; MG/1; MG/1
20 CAPSULE, EXTENDED RELEASE ORAL DAILY
Qty: 30 CAPSULE | Refills: 0 | Status: SHIPPED | OUTPATIENT
Start: 2023-02-12 | End: 2023-03-06

## 2023-01-26 NOTE — TELEPHONE ENCOUNTER
Dr. Christiansen- would you be willing to send his Adderall rx to [facility] on [address] instead of [facility] in [place]? If so, we can call and cancel his current rx.    Pharmacy pended.    Thank you,  Bernice Garcia RN  [facility]

## 2023-02-17 ENCOUNTER — THERAPY VISIT (OUTPATIENT)
Dept: PHYSICAL THERAPY | Facility: CLINIC | Age: 38
End: 2023-02-17
Attending: FAMILY MEDICINE
Payer: COMMERCIAL

## 2023-02-17 DIAGNOSIS — M25.561 CHRONIC PAIN OF RIGHT KNEE: ICD-10-CM

## 2023-02-17 DIAGNOSIS — G89.29 CHRONIC PAIN OF RIGHT KNEE: ICD-10-CM

## 2023-02-17 PROCEDURE — 97110 THERAPEUTIC EXERCISES: CPT | Mod: GP | Performed by: PHYSICAL THERAPIST

## 2023-02-17 PROCEDURE — 97161 PT EVAL LOW COMPLEX 20 MIN: CPT | Mod: GP | Performed by: PHYSICAL THERAPIST

## 2023-02-17 ASSESSMENT — ACTIVITIES OF DAILY LIVING (ADL)
KNEE_ACTIVITY_OF_DAILY_LIVING_SUM: 47
PAIN: THE SYMPTOM AFFECTS MY ACTIVITY SLIGHTLY
HOW_WOULD_YOU_RATE_THE_CURRENT_FUNCTION_OF_YOUR_KNEE_DURING_YOUR_USUAL_DAILY_ACTIVITIES_ON_A_SCALE_FROM_0_TO_100_WITH_100_BEING_YOUR_LEVEL_OF_KNEE_FUNCTION_PRIOR_TO_YOUR_INJURY_AND_0_BEING_THE_INABILITY_TO_PERFORM_ANY_OF_YOUR_USUAL_DAILY_ACTIVITIES?: 74
LIMPING: I HAVE THE SYMPTOM BUT IT DOES NOT AFFECT MY ACTIVITY
WEAKNESS: THE SYMPTOM AFFECTS MY ACTIVITY SLIGHTLY
RISE FROM A CHAIR: ACTIVITY IS MINIMALLY DIFFICULT
SIT WITH YOUR KNEE BENT: ACTIVITY IS SOMEWHAT DIFFICULT
KNEEL ON THE FRONT OF YOUR KNEE: ACTIVITY IS SOMEWHAT DIFFICULT
HOW_WOULD_YOU_RATE_THE_OVERALL_FUNCTION_OF_YOUR_KNEE_DURING_YOUR_USUAL_DAILY_ACTIVITIES?: NEARLY NORMAL
GIVING WAY, BUCKLING OR SHIFTING OF KNEE: THE SYMPTOM AFFECTS MY ACTIVITY MODERATELY
WALK: ACTIVITY IS NOT DIFFICULT
GO UP STAIRS: ACTIVITY IS SOMEWHAT DIFFICULT
RAW_SCORE: 47
SWELLING: I DO NOT HAVE THE SYMPTOM
KNEE_ACTIVITY_OF_DAILY_LIVING_SCORE: 67.14
AS_A_RESULT_OF_YOUR_KNEE_INJURY,_HOW_WOULD_YOU_RATE_YOUR_CURRENT_LEVEL_OF_DAILY_ACTIVITY?: NEARLY NORMAL
STIFFNESS: THE SYMPTOM AFFECTS MY ACTIVITY SLIGHTLY
SQUAT: ACTIVITY IS SOMEWHAT DIFFICULT
GO DOWN STAIRS: ACTIVITY IS FAIRLY DIFFICULT
STAND: ACTIVITY IS MINIMALLY DIFFICULT

## 2023-02-17 NOTE — PROGRESS NOTES
Physical Therapy Initial Evaluation  Subjective:  The history is provided by the patient. No  was used.   Patient Health History  Johan Cole being seen for Right knee pain.     Problem began: 10/10/2022.   Problem occurred: Recreational softball/ running/slip   Pain is reported as 3/10 and 4/10 on pain scale.  General health as reported by patient is fair.  Pertinent medical history includes: depression and high blood pressure.     Medical allergies: none.   Surgeries include:  None.    Current medications:  Other. Other medications details: Adhd meds.    Current occupation .   Primary job tasks include:  Computer work, driving, lifting/carrying, operating a machine/assembly, prolonged sitting, prolonged standing, pushing/pulling and repetitive tasks.   Other job/home tasks details: Physical labor/ ladder work. Kneeling crouching lifting.                Therapist Generated HPI Evaluation  Problem details: Patient was playing softball and he stepped back and the back foot slipped and got a hyper extension of the R knee.  He felt some swelling (just under the knee cap), weakness, and pulled himself out of the game. Patient thinks he heard a pop in the knee.  Over time improved, however the more he had exercised the more sore he is. He is an . Uncomfortable with kneeling, steps, squatting, sitting for a long period of time (30  Minutes). .         Type of problem:  Right knee.    This is a chronic condition.  Condition occurred with:  Other reason.  Where condition occurred: during recreation/sport.  Patient reports pain:  Anterior (below the patella. ).  Pain is described as aching and is intermittent.    Since onset symptoms are gradually worsening.  Associated symptoms:  Buckling/giving out and loss of strength.  and relieved by NSAID's (stretching the knee out, friction massage, knee cap motion, ).      Restrictions due to condition include:  Working in normal job  without restrictions.  Barriers include:  None as reported by patient.                        Objective:  System                                                Knee Evaluation:  ROM:    AROM    Hyperextension:  Left:  5    Right: 2    Flexion: Left: 135    Right: 130        Strength:     Extension:  Left: 5/5   Pain:      Right: 5/5   Pain:  Flexion:  Left: 5/5   Pain:      Right: 5/5   Pain:    Quad Set Left: Good    Pain:   Quad Set Right:  Fair    Pain: +  Ligament Testing:  Ligament testing knee: some increased mobility noted in Bilateral Lachman's, however equal and hard end feel noted.   Varus 0:  Left:  Neg   Right:  Neg  Varus 30:  Left:  Neg  Right:  Neg  Valgus 0:  Left:  Neg  Right:  Neg  Valgus 30:  Left:  Neg    Right:  Pos and Gr II    Posterior Drawer: Left:  Neg  Right:  Trace  Lachman's:  Left:  Neg  Right:  Neg  Special Tests:     Left knee negative for the following special tests:  Patellar compression    Right knee negative for the following special tests:  Patellar Compression  Palpation:      Right knee tenderness present at:  Patellar Lateral and Patellar Inferior  Edema:  Normal    Mobility Testing:      Patellofemoral Medial:  Left: normal    Right: normal  Patellofemoral Lateral:  Left: normal    Right: normal  Patellofemoral Superior:  Left: normal    Right: normal  Patellofemoral Inferior:  Left: normal    Right: normal  Functional Testing:  : Squat: pateitn can perform 50% of full squat with pain and dynamic valgus present through both knees.                       General Evaluation:                              Gait:    Significant findings:  Slight compensated trendeleberg gait pattern during R stance phase of gait.  Out toeing bilaterally.                                      ROS    Assessment/Plan:    Patient is a 38 year old male with right side knee complaints.    Patient has the following significant findings with corresponding treatment plan.                Diagnosis 1:  R knee  pain  Pain -  hot/cold therapy, electric stimulation, manual therapy, splint/taping/bracing/orthotics, self management, education, directional preference exercise and home program  Decreased ROM/flexibility - manual therapy, therapeutic exercise, therapeutic activity and home program  Decreased strength - therapeutic exercise, therapeutic activities and home program  Decreased proprioception - neuro re-education, gait training, therapeutic activities and home program  Impaired gait - gait training, assistive devices and home program  Impaired muscle performance - neuro re-education and home program  Decreased function - therapeutic activities and home program    Therapy Evaluation Codes:   1) Clinical presentation characteristics are:   Stable/Uncomplicated.  2) Decision-Making    Low complexity using standardized patient assessment instrument and/or measureable assessment of functional outcome.  Cumulative Therapy Evaluation is: Low complexity.    Previous and current functional limitations:  (See Goal Flow Sheet for this information)    Short term and Long term goals: (See Goal Flow Sheet for this information)     Communication ability:  Patient appears to be able to clearly communicate and understand verbal and written communication and follow directions correctly.  Treatment Explanation - The following has been discussed with the patient:   RX ordered/plan of care  Anticipated outcomes  Possible risks and side effects  This patient would benefit from PT intervention to resume normal activities.   Rehab potential is good.    Frequency:  1 X week, once daily  Duration:  for 8 weeks  Discharge Plan:  Achieve all LTG.  Independent in home treatment program.  Reach maximal therapeutic benefit.    Please refer to the daily flowsheet for treatment today, total treatment time and time spent performing 1:1 timed codes.

## 2023-03-03 ENCOUNTER — THERAPY VISIT (OUTPATIENT)
Dept: PHYSICAL THERAPY | Facility: CLINIC | Age: 38
End: 2023-03-03
Payer: COMMERCIAL

## 2023-03-03 DIAGNOSIS — G89.29 CHRONIC PAIN OF RIGHT KNEE: Primary | ICD-10-CM

## 2023-03-03 DIAGNOSIS — M25.561 CHRONIC PAIN OF RIGHT KNEE: Primary | ICD-10-CM

## 2023-03-03 PROCEDURE — 97112 NEUROMUSCULAR REEDUCATION: CPT | Mod: GP | Performed by: PHYSICAL THERAPIST

## 2023-03-03 PROCEDURE — 97110 THERAPEUTIC EXERCISES: CPT | Mod: GP | Performed by: PHYSICAL THERAPIST

## 2023-03-06 ENCOUNTER — OFFICE VISIT (OUTPATIENT)
Dept: FAMILY MEDICINE | Facility: CLINIC | Age: 38
End: 2023-03-06
Payer: COMMERCIAL

## 2023-03-06 VITALS
RESPIRATION RATE: 18 BRPM | HEIGHT: 74 IN | WEIGHT: 234 LBS | BODY MASS INDEX: 30.03 KG/M2 | OXYGEN SATURATION: 98 % | SYSTOLIC BLOOD PRESSURE: 128 MMHG | HEART RATE: 93 BPM | TEMPERATURE: 97.9 F | DIASTOLIC BLOOD PRESSURE: 86 MMHG

## 2023-03-06 DIAGNOSIS — R03.0 ELEVATED BP WITHOUT DIAGNOSIS OF HYPERTENSION: ICD-10-CM

## 2023-03-06 DIAGNOSIS — F90.0 ATTENTION DEFICIT HYPERACTIVITY DISORDER (ADHD), PREDOMINANTLY INATTENTIVE TYPE: Primary | ICD-10-CM

## 2023-03-06 PROCEDURE — 90746 HEPB VACCINE 3 DOSE ADULT IM: CPT | Performed by: FAMILY MEDICINE

## 2023-03-06 PROCEDURE — 99214 OFFICE O/P EST MOD 30 MIN: CPT | Mod: 25 | Performed by: FAMILY MEDICINE

## 2023-03-06 PROCEDURE — 90471 IMMUNIZATION ADMIN: CPT | Performed by: FAMILY MEDICINE

## 2023-03-06 RX ORDER — DEXTROAMPHETAMINE SACCHARATE, AMPHETAMINE ASPARTATE MONOHYDRATE, DEXTROAMPHETAMINE SULFATE AND AMPHETAMINE SULFATE 5; 5; 5; 5 MG/1; MG/1; MG/1; MG/1
20 CAPSULE, EXTENDED RELEASE ORAL DAILY
Qty: 30 CAPSULE | Refills: 0 | Status: SHIPPED | OUTPATIENT
Start: 2023-03-06 | End: 2023-04-05

## 2023-03-06 RX ORDER — DEXTROAMPHETAMINE SACCHARATE, AMPHETAMINE ASPARTATE MONOHYDRATE, DEXTROAMPHETAMINE SULFATE AND AMPHETAMINE SULFATE 5; 5; 5; 5 MG/1; MG/1; MG/1; MG/1
20 CAPSULE, EXTENDED RELEASE ORAL DAILY
Qty: 30 CAPSULE | Refills: 0 | Status: SHIPPED | OUTPATIENT
Start: 2023-05-07 | End: 2023-06-06

## 2023-03-06 RX ORDER — DEXTROAMPHETAMINE SACCHARATE, AMPHETAMINE ASPARTATE MONOHYDRATE, DEXTROAMPHETAMINE SULFATE AND AMPHETAMINE SULFATE 5; 5; 5; 5 MG/1; MG/1; MG/1; MG/1
20 CAPSULE, EXTENDED RELEASE ORAL DAILY
Qty: 30 CAPSULE | Refills: 0 | Status: SHIPPED | OUTPATIENT
Start: 2023-04-06 | End: 2023-05-06

## 2023-03-06 ASSESSMENT — ANXIETY QUESTIONNAIRES
5. BEING SO RESTLESS THAT IT IS HARD TO SIT STILL: NOT AT ALL
6. BECOMING EASILY ANNOYED OR IRRITABLE: SEVERAL DAYS
GAD7 TOTAL SCORE: 1
7. FEELING AFRAID AS IF SOMETHING AWFUL MIGHT HAPPEN: NOT AT ALL
1. FEELING NERVOUS, ANXIOUS, OR ON EDGE: NOT AT ALL
IF YOU CHECKED OFF ANY PROBLEMS ON THIS QUESTIONNAIRE, HOW DIFFICULT HAVE THESE PROBLEMS MADE IT FOR YOU TO DO YOUR WORK, TAKE CARE OF THINGS AT HOME, OR GET ALONG WITH OTHER PEOPLE: NOT DIFFICULT AT ALL
GAD7 TOTAL SCORE: 1
8. IF YOU CHECKED OFF ANY PROBLEMS, HOW DIFFICULT HAVE THESE MADE IT FOR YOU TO DO YOUR WORK, TAKE CARE OF THINGS AT HOME, OR GET ALONG WITH OTHER PEOPLE?: NOT DIFFICULT AT ALL
GAD7 TOTAL SCORE: 1
7. FEELING AFRAID AS IF SOMETHING AWFUL MIGHT HAPPEN: NOT AT ALL
3. WORRYING TOO MUCH ABOUT DIFFERENT THINGS: NOT AT ALL
2. NOT BEING ABLE TO STOP OR CONTROL WORRYING: NOT AT ALL
4. TROUBLE RELAXING: NOT AT ALL

## 2023-03-06 ASSESSMENT — PAIN SCALES - GENERAL: PAINLEVEL: NO PAIN (0)

## 2023-03-06 ASSESSMENT — PATIENT HEALTH QUESTIONNAIRE - PHQ9
10. IF YOU CHECKED OFF ANY PROBLEMS, HOW DIFFICULT HAVE THESE PROBLEMS MADE IT FOR YOU TO DO YOUR WORK, TAKE CARE OF THINGS AT HOME, OR GET ALONG WITH OTHER PEOPLE: NOT DIFFICULT AT ALL
SUM OF ALL RESPONSES TO PHQ QUESTIONS 1-9: 3
SUM OF ALL RESPONSES TO PHQ QUESTIONS 1-9: 3

## 2023-03-06 NOTE — PROGRESS NOTES
"  Assessment & Plan     Attention deficit hyperactivity disorder (ADHD), predominantly inattentive type  Patient is tolerating current medication without any major side effects of concerns and current dose seems reasonable too.  Current medication regime is effective. Continue current treatment without any changes.   - amphetamine-dextroamphetamine (ADDERALL XR) 20 MG 24 hr capsule; Take 1 capsule (20 mg) by mouth daily for 30 days  - amphetamine-dextroamphetamine (ADDERALL XR) 20 MG 24 hr capsule; Take 1 capsule (20 mg) by mouth daily for 30 days  - amphetamine-dextroamphetamine (ADDERALL XR) 20 MG 24 hr capsule; Take 1 capsule (20 mg) by mouth daily for 30 days    Elevated BP without diagnosis of hypertension  Repeat blood pressure is much better.  Monitoring blood pressure at home.  Anxiety may be contributing.  Continue with monitoring.  No need for any medication at this point.               BMI:   Estimated body mass index is 30.03 kg/m  as calculated from the following:    Height as of this encounter: 1.88 m (6' 2.02\").    Weight as of this encounter: 106.1 kg (234 lb).           Return in about 3 months (around 6/6/2023).   Follow-up Visit   Expected date:  Jun 06, 2023 (Approximate)      Follow Up Appointment Details:     Follow-up with whom?: Me    Follow-Up for what?: Chronic Disease f/u    Chronic Disease f/u: General (Other)    How?: Video                    Tay Christiansen MD, MD  [facility]    Javier Lima is a 38 year old, presenting for the following health issues:  Hypertension and Recheck Medication      History of Present Illness       Hypertension: He presents for follow up of hypertension.  He does check blood pressure  regularly outside of the clinic. Outpatient blood pressures have not been over 140/90. He does not follow a low salt diet.     Reason for visit:  Adhd    He eats 2-3 servings of fruits and vegetables daily.He consumes 2 sweetened " "beverage(s) daily.He exercises with enough effort to increase his heart rate 10 to 19 minutes per day.  He exercises with enough effort to increase his heart rate 5 days per week. He is missing 1 dose(s) of medications per week.  He is not taking prescribed medications regularly due to other.    Today's PHQ-9         PHQ-9 Total Score: 3    PHQ-9 Q9 Thoughts of better off dead/self-harm past 2 weeks :   Not at all    How difficult have these problems made it for you to do your work, take care of things at home, or get along with other people: Not difficult at all  Today's NEERAJ-7 Score: 1     At home 135/85 readings average.   Never been up to 140/85 at home.    Girlfriend is not working for a year and it is stressful. Dog at home is having health issue.  Hoping to resume therapist.     Shauna took it this morning - 6:30  No issue with it.   One month was a problem getting rx but not consistently.     Started PT for knee.   -  Review of Systems         Objective    BP (!) 140/98 (BP Location: Right arm, Patient Position: Sitting, Cuff Size: Adult Regular)   Pulse 93   Temp 97.9  F (36.6  C) (Temporal)   Resp 18   Ht 1.88 m (6' 2.02\")   Wt 106.1 kg (234 lb)   SpO2 98%   BMI 30.03 kg/m    Body mass index is 30.03 kg/m .  Physical Exam                       "

## 2023-03-10 ENCOUNTER — THERAPY VISIT (OUTPATIENT)
Dept: PHYSICAL THERAPY | Facility: CLINIC | Age: 38
End: 2023-03-10
Payer: COMMERCIAL

## 2023-03-10 DIAGNOSIS — G89.29 CHRONIC PAIN OF RIGHT KNEE: Primary | ICD-10-CM

## 2023-03-10 DIAGNOSIS — M25.561 CHRONIC PAIN OF RIGHT KNEE: Primary | ICD-10-CM

## 2023-03-10 PROCEDURE — 97110 THERAPEUTIC EXERCISES: CPT | Mod: GP | Performed by: PHYSICAL THERAPIST

## 2023-03-10 PROCEDURE — 97112 NEUROMUSCULAR REEDUCATION: CPT | Mod: GP | Performed by: PHYSICAL THERAPIST

## 2023-03-24 ENCOUNTER — THERAPY VISIT (OUTPATIENT)
Dept: PHYSICAL THERAPY | Facility: CLINIC | Age: 38
End: 2023-03-24
Payer: COMMERCIAL

## 2023-03-24 DIAGNOSIS — M25.561 CHRONIC PAIN OF RIGHT KNEE: Primary | ICD-10-CM

## 2023-03-24 DIAGNOSIS — G89.29 CHRONIC PAIN OF RIGHT KNEE: Primary | ICD-10-CM

## 2023-03-24 PROCEDURE — 97110 THERAPEUTIC EXERCISES: CPT | Mod: GP | Performed by: PHYSICAL THERAPIST

## 2023-03-24 PROCEDURE — 97112 NEUROMUSCULAR REEDUCATION: CPT | Mod: GP | Performed by: PHYSICAL THERAPIST

## 2023-05-19 PROBLEM — M25.561 CHRONIC PAIN OF RIGHT KNEE: Status: RESOLVED | Noted: 2023-02-17 | Resolved: 2023-05-19

## 2023-05-19 PROBLEM — G89.29 CHRONIC PAIN OF RIGHT KNEE: Status: RESOLVED | Noted: 2023-02-17 | Resolved: 2023-05-19

## 2023-05-19 NOTE — PROGRESS NOTES
Discharge Note    Progress reporting period is from initial evaluation date (please see noted date below) to Mar 24, 2023.  Linked Episodes   Type: Episode: Status: Noted: Resolved: Last update: Updated by:   PHYSICAL THERAPY R knee Pain 2/17/2023 Active 2/17/2023  3/24/2023  3:18 PM Lupe Cristobal, PT      Comments:       Johan failed to follow up and current status is unknown.  Please see information below for last relevant information on current status.  Patient seen for 4 visits.    SUBJECTIVE  Subjective changes noted by patient:  Not experiencing the stiffnes as he was previously.  Still gets some tenderness with going down stairs, sometimes.  .  Current pain level is  .     Previous pain level was  4/10.   Changes in function:  Yes (See Goal flowsheet attached for changes in current functional level)  Adverse reaction to treatment or activity: None    OBJECTIVE  Changes noted in objective findings: Pt struggles with dynamic valgus in single leg stance reachback exercise     ASSESSMENT/PLAN  Diagnosis: R knee pain   STG/LTGs have been met or progress has been made towards goals:  Yes, please see goal flowsheet for most current information  Assessment of Progress: current status is unknown.    Last current status: Pt is progressing well   Self Management Plans:  HEP  I have re-evaluated this patient and find that the nature, scope, duration and intensity of the therapy is appropriate for the medical condition of the patient.  Johan continues to require the following intervention to meet STG and LTG's:  HEP.    Recommendations:  Discharge with current home program.  Patient to follow up with MD as needed.    Please refer to the daily flowsheet for treatment today, total treatment time and time spent performing 1:1 timed codes.

## 2023-06-06 ENCOUNTER — VIRTUAL VISIT (OUTPATIENT)
Dept: FAMILY MEDICINE | Facility: CLINIC | Age: 38
End: 2023-06-06
Attending: FAMILY MEDICINE
Payer: COMMERCIAL

## 2023-06-06 DIAGNOSIS — F90.0 ATTENTION DEFICIT HYPERACTIVITY DISORDER (ADHD), PREDOMINANTLY INATTENTIVE TYPE: Primary | ICD-10-CM

## 2023-06-06 PROCEDURE — 99214 OFFICE O/P EST MOD 30 MIN: CPT | Mod: VID | Performed by: FAMILY MEDICINE

## 2023-06-06 RX ORDER — DEXTROAMPHETAMINE SACCHARATE, AMPHETAMINE ASPARTATE MONOHYDRATE, DEXTROAMPHETAMINE SULFATE AND AMPHETAMINE SULFATE 5; 5; 5; 5 MG/1; MG/1; MG/1; MG/1
20 CAPSULE, EXTENDED RELEASE ORAL DAILY
Qty: 30 CAPSULE | Refills: 0 | Status: SHIPPED | OUTPATIENT
Start: 2023-06-06 | End: 2023-07-06

## 2023-06-06 RX ORDER — DEXTROAMPHETAMINE SACCHARATE, AMPHETAMINE ASPARTATE MONOHYDRATE, DEXTROAMPHETAMINE SULFATE AND AMPHETAMINE SULFATE 5; 5; 5; 5 MG/1; MG/1; MG/1; MG/1
20 CAPSULE, EXTENDED RELEASE ORAL DAILY
Qty: 30 CAPSULE | Refills: 0 | Status: SHIPPED | OUTPATIENT
Start: 2023-08-07 | End: 2023-09-06

## 2023-06-06 RX ORDER — DEXTROAMPHETAMINE SACCHARATE, AMPHETAMINE ASPARTATE MONOHYDRATE, DEXTROAMPHETAMINE SULFATE AND AMPHETAMINE SULFATE 5; 5; 5; 5 MG/1; MG/1; MG/1; MG/1
20 CAPSULE, EXTENDED RELEASE ORAL DAILY
Qty: 30 CAPSULE | Refills: 0 | Status: SHIPPED | OUTPATIENT
Start: 2023-07-07 | End: 2023-08-06

## 2023-06-06 NOTE — PROGRESS NOTES
"Clarence is a 38 year old who is being evaluated via a billable video visit.      How would you like to obtain your AVS? MyChart  If the video visit is dropped, the invitation should be resent by: Text to cell phone: [phone]  Will anyone else be joining your video visit? No        Assessment & Plan     Attention deficit hyperactivity disorder (ADHD), predominantly inattentive type  Patient is tolerating current medication without any major side effects of concerns and current dose seems reasonable too.  Current medication regime is effective. Continue current treatment without any changes.  PDMP checked.   - amphetamine-dextroamphetamine (ADDERALL XR) 20 MG 24 hr capsule; Take 1 capsule (20 mg) by mouth daily for 30 days  - amphetamine-dextroamphetamine (ADDERALL XR) 20 MG 24 hr capsule; Take 1 capsule (20 mg) by mouth daily for 30 days  - amphetamine-dextroamphetamine (ADDERALL XR) 20 MG 24 hr capsule; Take 1 capsule (20 mg) by mouth daily for 30 days             BMI:   Estimated body mass index is 30.03 kg/m  as calculated from the following:    Height as of 3/6/23: 1.88 m (6' 2.02\").    Weight as of 3/6/23: 106.1 kg (234 lb).   Weight management plan: Discussed healthy diet and exercise guidelines         Tay Christiansen MD, MD  [facility]    Subjective   Clarence is a 38 year old, presenting for the following health issues:  Recheck Medication         View : No data to display.              History of Present Illness       Reason for visit:  Medication check up    He eats 2-3 servings of fruits and vegetables daily.He consumes 2 sweetened beverage(s) daily.He exercises with enough effort to increase his heart rate 10 to 19 minutes per day.  He exercises with enough effort to increase his heart rate 3 or less days per week. He is missing 1 dose(s) of medications per week.  He is not taking prescribed medications regularly due to remembering to take and other.       Overall " doing well with health.     Did PT for his knee and it was helpful. Aggravated again but not hurting as much.   Doing exercises they gave him.     Blood pressure over weekend - 130/80 range. Pulse 77-80.     Adderall dose - feels it is OK. Able to complete work. Feels doing good with what he ha been with dose. Getting more recognition and opportunity at work.  Side effects - no major side effects. Sometime headaches very mild     Review of Systems         Objective           Vitals:  No vitals were obtained today due to virtual visit.    Physical Exam   GENERAL: Healthy, alert and no distress  EYES: Eyes grossly normal to inspection.  No discharge or erythema, or obvious scleral/conjunctival abnormalities.  RESP: No audible wheeze, cough, or visible cyanosis.  No visible retractions or increased work of breathing.    SKIN: Visible skin clear. No significant rash, abnormal pigmentation or lesions.  NEURO: Cranial nerves grossly intact.  Mentation and speech appropriate for age.  PSYCH: Mentation appears normal, affect normal/bright, judgement and insight intact, normal speech and appearance well-groomed.            Video-Visit Details    Type of service:  Video Visit     Originating Location (pt. Location): Home    Distant Location (provider location):  On-site  Platform used for Video Visit: Roland

## 2023-07-23 ENCOUNTER — HEALTH MAINTENANCE LETTER (OUTPATIENT)
Age: 38
End: 2023-07-23

## 2023-09-04 ASSESSMENT — ENCOUNTER SYMPTOMS
FEVER: 0
PARESTHESIAS: 1
MYALGIAS: 1
CONSTIPATION: 1
HEADACHES: 0
JOINT SWELLING: 1
DIZZINESS: 0
HEMATURIA: 0
SORE THROAT: 0
COUGH: 0
CHILLS: 0
HEARTBURN: 0
SHORTNESS OF BREATH: 0
DYSURIA: 0
DIARRHEA: 1
NERVOUS/ANXIOUS: 0
WEAKNESS: 0
PALPITATIONS: 0
EYE PAIN: 0
FREQUENCY: 0
NAUSEA: 0
HEMATOCHEZIA: 1
ABDOMINAL PAIN: 0
ARTHRALGIAS: 1

## 2023-09-06 ENCOUNTER — OFFICE VISIT (OUTPATIENT)
Dept: FAMILY MEDICINE | Facility: CLINIC | Age: 38
End: 2023-09-06
Attending: FAMILY MEDICINE
Payer: COMMERCIAL

## 2023-09-06 VITALS
HEIGHT: 74 IN | BODY MASS INDEX: 28.88 KG/M2 | SYSTOLIC BLOOD PRESSURE: 134 MMHG | OXYGEN SATURATION: 100 % | TEMPERATURE: 98.1 F | HEART RATE: 77 BPM | WEIGHT: 225 LBS | DIASTOLIC BLOOD PRESSURE: 88 MMHG | RESPIRATION RATE: 20 BRPM

## 2023-09-06 DIAGNOSIS — Z00.00 ROUTINE GENERAL MEDICAL EXAMINATION AT A HEALTH CARE FACILITY: Primary | ICD-10-CM

## 2023-09-06 DIAGNOSIS — B35.6 TINEA CRURIS: ICD-10-CM

## 2023-09-06 DIAGNOSIS — G89.29 CHRONIC PAIN OF RIGHT KNEE: ICD-10-CM

## 2023-09-06 DIAGNOSIS — F90.0 ATTENTION DEFICIT HYPERACTIVITY DISORDER (ADHD), PREDOMINANTLY INATTENTIVE TYPE: ICD-10-CM

## 2023-09-06 DIAGNOSIS — Z13.220 SCREENING FOR HYPERLIPIDEMIA: ICD-10-CM

## 2023-09-06 DIAGNOSIS — B35.1 ONYCHOMYCOSIS: ICD-10-CM

## 2023-09-06 DIAGNOSIS — Z11.3 SCREENING FOR STDS (SEXUALLY TRANSMITTED DISEASES): ICD-10-CM

## 2023-09-06 DIAGNOSIS — L65.9 HAIR LOSS: ICD-10-CM

## 2023-09-06 DIAGNOSIS — M25.561 CHRONIC PAIN OF RIGHT KNEE: ICD-10-CM

## 2023-09-06 LAB
ALBUMIN SERPL BCG-MCNC: 4.5 G/DL (ref 3.5–5.2)
ALP SERPL-CCNC: 79 U/L (ref 40–129)
ALT SERPL W P-5'-P-CCNC: 22 U/L (ref 0–70)
ANION GAP SERPL CALCULATED.3IONS-SCNC: 13 MMOL/L (ref 7–15)
AST SERPL W P-5'-P-CCNC: 23 U/L (ref 0–45)
BILIRUB SERPL-MCNC: 0.2 MG/DL
BUN SERPL-MCNC: 13.1 MG/DL (ref 6–20)
CALCIUM SERPL-MCNC: 9.6 MG/DL (ref 8.6–10)
CHLORIDE SERPL-SCNC: 103 MMOL/L (ref 98–107)
CHOLEST SERPL-MCNC: 192 MG/DL
CREAT SERPL-MCNC: 1.08 MG/DL (ref 0.67–1.17)
DEPRECATED HCO3 PLAS-SCNC: 24 MMOL/L (ref 22–29)
EGFRCR SERPLBLD CKD-EPI 2021: >90 ML/MIN/1.73M2
GLUCOSE SERPL-MCNC: 96 MG/DL (ref 70–99)
HDLC SERPL-MCNC: 33 MG/DL
LDLC SERPL CALC-MCNC: 108 MG/DL
NONHDLC SERPL-MCNC: 159 MG/DL
POTASSIUM SERPL-SCNC: 4.2 MMOL/L (ref 3.4–5.3)
PROT SERPL-MCNC: 7.3 G/DL (ref 6.4–8.3)
SODIUM SERPL-SCNC: 140 MMOL/L (ref 136–145)
T PALLIDUM AB SER QL: NONREACTIVE
TRIGL SERPL-MCNC: 254 MG/DL

## 2023-09-06 PROCEDURE — 36415 COLL VENOUS BLD VENIPUNCTURE: CPT | Performed by: FAMILY MEDICINE

## 2023-09-06 PROCEDURE — 99214 OFFICE O/P EST MOD 30 MIN: CPT | Mod: 25 | Performed by: FAMILY MEDICINE

## 2023-09-06 PROCEDURE — 99395 PREV VISIT EST AGE 18-39: CPT | Mod: 25 | Performed by: FAMILY MEDICINE

## 2023-09-06 PROCEDURE — 90472 IMMUNIZATION ADMIN EACH ADD: CPT | Performed by: FAMILY MEDICINE

## 2023-09-06 PROCEDURE — 90686 IIV4 VACC NO PRSV 0.5 ML IM: CPT | Performed by: FAMILY MEDICINE

## 2023-09-06 PROCEDURE — 87491 CHLMYD TRACH DNA AMP PROBE: CPT | Performed by: FAMILY MEDICINE

## 2023-09-06 PROCEDURE — 80061 LIPID PANEL: CPT | Performed by: FAMILY MEDICINE

## 2023-09-06 PROCEDURE — 90471 IMMUNIZATION ADMIN: CPT | Performed by: FAMILY MEDICINE

## 2023-09-06 PROCEDURE — 90746 HEPB VACCINE 3 DOSE ADULT IM: CPT | Performed by: FAMILY MEDICINE

## 2023-09-06 PROCEDURE — 80053 COMPREHEN METABOLIC PANEL: CPT | Performed by: FAMILY MEDICINE

## 2023-09-06 PROCEDURE — 86803 HEPATITIS C AB TEST: CPT | Performed by: FAMILY MEDICINE

## 2023-09-06 PROCEDURE — 87591 N.GONORRHOEAE DNA AMP PROB: CPT | Performed by: FAMILY MEDICINE

## 2023-09-06 PROCEDURE — 86706 HEP B SURFACE ANTIBODY: CPT | Performed by: FAMILY MEDICINE

## 2023-09-06 PROCEDURE — 87340 HEPATITIS B SURFACE AG IA: CPT | Performed by: FAMILY MEDICINE

## 2023-09-06 PROCEDURE — 86780 TREPONEMA PALLIDUM: CPT | Performed by: FAMILY MEDICINE

## 2023-09-06 PROCEDURE — 87389 HIV-1 AG W/HIV-1&-2 AB AG IA: CPT | Performed by: FAMILY MEDICINE

## 2023-09-06 RX ORDER — KETOCONAZOLE 20 MG/G
CREAM TOPICAL DAILY
Qty: 60 G | Refills: 0 | Status: SHIPPED | OUTPATIENT
Start: 2023-09-06 | End: 2024-03-04

## 2023-09-06 RX ORDER — DEXTROAMPHETAMINE SACCHARATE, AMPHETAMINE ASPARTATE MONOHYDRATE, DEXTROAMPHETAMINE SULFATE AND AMPHETAMINE SULFATE 5; 5; 5; 5 MG/1; MG/1; MG/1; MG/1
20 CAPSULE, EXTENDED RELEASE ORAL DAILY
Qty: 30 CAPSULE | Refills: 0 | Status: SHIPPED | OUTPATIENT
Start: 2023-10-07 | End: 2023-11-06

## 2023-09-06 RX ORDER — DEXTROAMPHETAMINE SACCHARATE, AMPHETAMINE ASPARTATE MONOHYDRATE, DEXTROAMPHETAMINE SULFATE AND AMPHETAMINE SULFATE 5; 5; 5; 5 MG/1; MG/1; MG/1; MG/1
20 CAPSULE, EXTENDED RELEASE ORAL DAILY
Qty: 30 CAPSULE | Refills: 0 | Status: SHIPPED | OUTPATIENT
Start: 2023-09-06 | End: 2023-10-06

## 2023-09-06 RX ORDER — DEXTROAMPHETAMINE SACCHARATE, AMPHETAMINE ASPARTATE MONOHYDRATE, DEXTROAMPHETAMINE SULFATE AND AMPHETAMINE SULFATE 5; 5; 5; 5 MG/1; MG/1; MG/1; MG/1
20 CAPSULE, EXTENDED RELEASE ORAL DAILY
Qty: 30 CAPSULE | Refills: 0 | Status: CANCELLED | OUTPATIENT
Start: 2023-09-06

## 2023-09-06 RX ORDER — CICLOPIROX 80 MG/ML
SOLUTION TOPICAL
Qty: 6.6 ML | Refills: 4 | Status: SHIPPED | OUTPATIENT
Start: 2023-09-06 | End: 2024-03-04

## 2023-09-06 RX ORDER — DEXTROAMPHETAMINE SACCHARATE, AMPHETAMINE ASPARTATE MONOHYDRATE, DEXTROAMPHETAMINE SULFATE AND AMPHETAMINE SULFATE 5; 5; 5; 5 MG/1; MG/1; MG/1; MG/1
20 CAPSULE, EXTENDED RELEASE ORAL DAILY
Qty: 30 CAPSULE | Refills: 0 | Status: SHIPPED | OUTPATIENT
Start: 2023-11-07 | End: 2023-12-07

## 2023-09-06 ASSESSMENT — ENCOUNTER SYMPTOMS
ABDOMINAL PAIN: 0
DIARRHEA: 1
DIZZINESS: 0
MYALGIAS: 1
FEVER: 0
COUGH: 0
SHORTNESS OF BREATH: 0
SORE THROAT: 0
CHILLS: 0
WEAKNESS: 0
HEARTBURN: 0
NERVOUS/ANXIOUS: 0
EYE PAIN: 0
ARTHRALGIAS: 1
HEMATOCHEZIA: 1
JOINT SWELLING: 1
PARESTHESIAS: 1
PALPITATIONS: 0
HEADACHES: 0
DYSURIA: 0
HEMATURIA: 0
CONSTIPATION: 1
FREQUENCY: 0
NAUSEA: 0

## 2023-09-06 ASSESSMENT — PATIENT HEALTH QUESTIONNAIRE - PHQ9
SUM OF ALL RESPONSES TO PHQ QUESTIONS 1-9: 3
10. IF YOU CHECKED OFF ANY PROBLEMS, HOW DIFFICULT HAVE THESE PROBLEMS MADE IT FOR YOU TO DO YOUR WORK, TAKE CARE OF THINGS AT HOME, OR GET ALONG WITH OTHER PEOPLE: NOT DIFFICULT AT ALL
SUM OF ALL RESPONSES TO PHQ QUESTIONS 1-9: 3

## 2023-09-06 ASSESSMENT — PAIN SCALES - GENERAL: PAINLEVEL: NO PAIN (0)

## 2023-09-06 NOTE — PROGRESS NOTES
SUBJECTIVE:   CC: Clarence is an 38 year old who presents for preventative health visit.       9/6/2023     3:19 PM   Additional Questions   Roomed by Elena CARR     Patient presents with:  Physical: Med check  Knee pain  BP follow up  Digestive mariam  Fading eye sight  Hairloss  Jock itch  Toe fungus    Healthy Habits:     Getting at least 3 servings of Calcium per day:  Yes    Bi-annual eye exam:  NO    Dental care twice a year:  Yes    Sleep apnea or symptoms of sleep apnea:  Daytime drowsiness    Diet:  Regular (no restrictions)    Frequency of exercise:  2-3 days/week    Duration of exercise:  30-45 minutes    Taking medications regularly:  Yes    Medication side effects:  None    Additional concerns today:  Yes    Today's PHQ-9 Score:       9/6/2023     3:12 PM   PHQ-9 SCORE   PHQ-9 Total Score MyChart 3 (Minimal depression)   PHQ-9 Total Score 3              Social History     Tobacco Use    Smoking status: Never     Passive exposure: Never    Smokeless tobacco: Never   Substance Use Topics    Alcohol use: Yes     Comment: 2 drinks a week              9/4/2023    11:00 AM   Alcohol Use   Prescreen: >3 drinks/day or >7 drinks/week? No          No data to display                Last PSA: No results found for: PSA    Reviewed orders with patient. Reviewed health maintenance and updated orders accordingly - Yes       Reviewed and updated as needed this visit by clinical staff   Tobacco  Allergies  Meds            Reviewed and updated as needed this visit by Provider     Med check - adderall is doing its job. Able to focus more at work. Able to follow through on stuff. Couple of headaches. Not sure if it is related to medicines.     Knee pain - right. Ongoing from injury. A year ago did PT. Did tendinitis PT. Had some exercises at home.     BP follow up - trying to make diet changes.     Digestive mariam - a month ago: blood in stool once. Inconsistent bowel movement. Changed diet and since then doing well. Had some  "gas pain left upper abdomen.     Fading eye sight - going to call ophthalmologist.     Hairloss - thinning of hairs. Currently just curious.     Jock itch - both sides. Using otc spray but comes back.     Toenail fungus - both sides. OTC but never used it.      Review of Systems   Constitutional:  Negative for chills and fever.   HENT:  Positive for congestion and ear pain. Negative for hearing loss and sore throat.    Eyes:  Positive for visual disturbance. Negative for pain.   Respiratory:  Negative for cough and shortness of breath.    Cardiovascular:  Negative for chest pain, palpitations and peripheral edema.   Gastrointestinal:  Positive for constipation, diarrhea and hematochezia. Negative for abdominal pain, heartburn and nausea.   Genitourinary:  Negative for dysuria, frequency, genital sores, hematuria, impotence, penile discharge and urgency.   Musculoskeletal:  Positive for arthralgias, joint swelling and myalgias.   Skin:  Negative for rash.   Neurological:  Positive for paresthesias. Negative for dizziness, weakness and headaches.   Psychiatric/Behavioral:  Negative for mood changes. The patient is not nervous/anxious.      OBJECTIVE:   /88 (BP Location: Right arm, Patient Position: Sitting, Cuff Size: Adult Regular)   Pulse 77   Temp 98.1  F (36.7  C) (Temporal)   Resp 20   Ht 1.88 m (6' 2\")   Wt 102.1 kg (225 lb)   SpO2 100%   BMI 28.89 kg/m      Physical Exam  GENERAL: healthy, alert and no distress  EYES: Eyes grossly normal to inspection, PERRL and conjunctivae and sclerae normal  HENT: ear canals and TM's normal, nose and mouth without ulcers or lesions  NECK: no adenopathy, no asymmetry, masses, or scars and thyroid normal to palpation  RESP: lungs clear to auscultation - no rales, rhonchi or wheezes  CV: regular rate and rhythm, normal S1 S2, no S3 or S4, no murmur, click or rub, no peripheral edema and peripheral pulses strong  ABDOMEN: soft, nontender, no hepatosplenomegaly, no " masses and bowel sounds normal   (male): normal male genitalia without lesions or urethral discharge, no hernia  MS: no gross musculoskeletal defects noted, no edema  SKIN: no suspicious lesions or rashes, male pattern baldness present, both great toes tips of toes with onychomycosis changes.   NEURO: Normal strength and tone, mentation intact and speech normal  PSYCH: mentation appears normal, affect normal/bright        ASSESSMENT/PLAN:   (Z00.00) Routine general medical examination at a health care facility  (primary encounter diagnosis)  Comment:    Plan:      (F90.0) Attention deficit hyperactivity disorder (ADHD), predominantly inattentive type  Comment: Patient is tolerating current medication without any major side effects of concerns and current dose seems reasonable too.  Current medication regime is effective. Continue current treatment without any changes.   Plan: amphetamine-dextroamphetamine (ADDERALL XR) 20         MG 24 hr capsule, amphetamine-dextroamphetamine        (ADDERALL XR) 20 MG 24 hr capsule,         amphetamine-dextroamphetamine (ADDERALL XR) 20         MG 24 hr capsule             (M25.561,  G89.29) Chronic pain of right knee  Comment:    Plan:  persistent. Will do home PT. If fails - will call for sprots med referral.    (L65.9) Hair loss  Comment: we talked about topical vs oral meds.   Plan: he will monitor for now and will notify if interested.     (B35.1) Onychomycosis  Comment: not severe changes and should respond to topical antifungal. If fails, reevaluate. Discussed oral antifungal.   Plan: ciclopirox (PENLAC) 8 % external solution,         Comprehensive metabolic panel (BMP + Alb, Alk         Phos, ALT, AST, Total. Bili, TP)             (B35.6) Tinea cruris  Comment:  topical antifungal. Keep area clean and dry. Discussed recurrent nature of disease.   Plan: ketoconazole (NIZORAL) 2 % external cream             (Z13.220) Screening for hyperlipidemia  Comment:    Plan: Lipid  panel reflex to direct LDL Fasting             (Z11.3) Screening for STDs (sexually transmitted diseases)  Comment:    Plan: NEISSERIA GONORRHOEA PCR, HIV Antigen Antibody         Combo Cascade, Treponema Abs w Reflex to RPR         and Titer, Hepatitis B surface antigen,         Hepatitis B Surface Antibody, Hepatitis C         Screen Reflex to HCV RNA Quant and Genotype,         CHLAMYDIA TRACHOMATIS PCR                   COUNSELING:   Reviewed preventive health counseling, as reflected in patient instructions        He reports that he has never smoked. He has never been exposed to tobacco smoke. He has never used smokeless tobacco.            Tay Christiansen MD, MD  [facility] submitted by the patient for this visit:  Patient Health Questionnaire (Submitted on 9/6/2023)  If you checked off any problems, how difficult have these problems made it for you to do your work, take care of things at home, or get along with other people?: Not difficult at all  PHQ9 TOTAL SCORE: 3

## 2023-09-06 NOTE — NURSING NOTE
Prior to immunization administration, verified patients identity using patient s name and date of birth. Please see Immunization Activity for additional information.     Screening Questionnaire for Adult Immunization    Are you sick today?   No   Do you have allergies to medications, food, a vaccine component or latex?   No   Have you ever had a serious reaction after receiving a vaccination?   No   Do you have a long-term health problem with heart, lung, kidney, or metabolic disease (e.g., diabetes), asthma, a blood disorder, no spleen, complement component deficiency, a cochlear implant, or a spinal fluid leak?  Are you on long-term aspirin therapy?   No   Do you have cancer, leukemia, HIV/AIDS, or any other immune system problem?   No   Do you have a parent, brother, or sister with an immune system problem?   No   In the past 3 months, have you taken medications that affect  your immune system, such as prednisone, other steroids, or anticancer drugs; drugs for the treatment of rheumatoid arthritis, Crohn s disease, or psoriasis; or have you had radiation treatments?   No   Have you had a seizure, or a brain or other nervous system problem?   No   During the past year, have you received a transfusion of blood or blood    products, or been given immune (gamma) globulin or antiviral drug?   No   For women: Are you pregnant or is there a chance you could become       pregnant during the next month?   No   Have you received any vaccinations in the past 4 weeks?   No     Immunization questionnaire answers were all negative.      Patient instructed to remain in clinic for 15 minutes afterwards, and to report any adverse reactions.     Screening performed by Tien Iyer CMA on 9/6/2023 at 3:55 PM.

## 2023-09-07 LAB
C TRACH DNA SPEC QL NAA+PROBE: NEGATIVE
HBV SURFACE AB SERPL IA-ACNC: >1000 M[IU]/ML
HBV SURFACE AB SERPL IA-ACNC: REACTIVE M[IU]/ML
HBV SURFACE AG SERPL QL IA: NONREACTIVE
HCV AB SERPL QL IA: NONREACTIVE
HIV 1+2 AB+HIV1 P24 AG SERPL QL IA: NONREACTIVE
N GONORRHOEA DNA SPEC QL NAA+PROBE: NEGATIVE

## 2023-12-06 ENCOUNTER — VIRTUAL VISIT (OUTPATIENT)
Dept: FAMILY MEDICINE | Facility: CLINIC | Age: 38
End: 2023-12-06
Payer: COMMERCIAL

## 2023-12-06 DIAGNOSIS — F90.0 ATTENTION DEFICIT HYPERACTIVITY DISORDER (ADHD), PREDOMINANTLY INATTENTIVE TYPE: ICD-10-CM

## 2023-12-06 PROCEDURE — 99213 OFFICE O/P EST LOW 20 MIN: CPT | Mod: VID | Performed by: FAMILY MEDICINE

## 2023-12-06 RX ORDER — DEXTROAMPHETAMINE SACCHARATE, AMPHETAMINE ASPARTATE MONOHYDRATE, DEXTROAMPHETAMINE SULFATE AND AMPHETAMINE SULFATE 5; 5; 5; 5 MG/1; MG/1; MG/1; MG/1
20 CAPSULE, EXTENDED RELEASE ORAL DAILY
Qty: 30 CAPSULE | Refills: 0 | Status: CANCELLED | OUTPATIENT
Start: 2023-12-06

## 2023-12-06 RX ORDER — DEXTROAMPHETAMINE SACCHARATE, AMPHETAMINE ASPARTATE MONOHYDRATE, DEXTROAMPHETAMINE SULFATE AND AMPHETAMINE SULFATE 5; 5; 5; 5 MG/1; MG/1; MG/1; MG/1
20 CAPSULE, EXTENDED RELEASE ORAL DAILY
Qty: 30 CAPSULE | Refills: 0 | Status: SHIPPED | OUTPATIENT
Start: 2023-12-06 | End: 2024-01-05

## 2023-12-06 RX ORDER — DEXTROAMPHETAMINE SACCHARATE, AMPHETAMINE ASPARTATE MONOHYDRATE, DEXTROAMPHETAMINE SULFATE AND AMPHETAMINE SULFATE 5; 5; 5; 5 MG/1; MG/1; MG/1; MG/1
20 CAPSULE, EXTENDED RELEASE ORAL DAILY
Qty: 30 CAPSULE | Refills: 0 | Status: SHIPPED | OUTPATIENT
Start: 2024-01-06 | End: 2024-02-05

## 2023-12-06 RX ORDER — DEXTROAMPHETAMINE SACCHARATE, AMPHETAMINE ASPARTATE MONOHYDRATE, DEXTROAMPHETAMINE SULFATE AND AMPHETAMINE SULFATE 5; 5; 5; 5 MG/1; MG/1; MG/1; MG/1
20 CAPSULE, EXTENDED RELEASE ORAL DAILY
Qty: 30 CAPSULE | Refills: 0 | Status: SHIPPED | OUTPATIENT
Start: 2024-02-06 | End: 2024-03-07

## 2023-12-06 NOTE — PROGRESS NOTES
"Clarence is a 38 year old who is being evaluated via a billable video visit.      How would you like to obtain your AVS? MyChart  If the video visit is dropped, the invitation should be resent by: Send to e-mail at: [email or web address]"  Will anyone else be joining your video visit? No      Assessment & Plan     Attention deficit hyperactivity disorder (ADHD), predominantly inattentive type  Having some headaches.  We discussed that during holidays will take a break from his Adderall for a few days consistently and see if it helps with the headaches.  He agreed.  He will also monitor his blood pressure readings during that time.  PDMP checked.  - amphetamine-dextroamphetamine (ADDERALL XR) 20 MG 24 hr capsule; Take 1 capsule (20 mg) by mouth daily for 30 days  - amphetamine-dextroamphetamine (ADDERALL XR) 20 MG 24 hr capsule; Take 1 capsule (20 mg) by mouth daily for 30 days  - amphetamine-dextroamphetamine (ADDERALL XR) 20 MG 24 hr capsule; Take 1 capsule (20 mg) by mouth daily for 30 days             BMI:   Estimated body mass index is 28.89 kg/m  as calculated from the following:    Height as of 9/6/23: 1.88 m (6' 2\").    Weight as of 9/6/23: 102.1 kg (225 lb).           Tay Christiansen MD, MD  [facility]    Subjective   Clarence is a 38 year old, presenting for the following health issues:  Recheck Medication      12/6/2023    11:09 AM   Additional Questions   Roomed by Elena GRAHAM     History of Present Illness       Reason for visit:  Medication follow up. general wellness    He eats 2-3 servings of fruits and vegetables daily.He consumes 2 sweetened beverage(s) daily.He exercises with enough effort to increase his heart rate 10 to 19 minutes per day.  He exercises with enough effort to increase his heart rate 4 days per week.   He is taking medications regularly.     Doing well.     Headaches more often in the morning and sometime in afternoon. Couple of times per week. Not sure if " it is dehydration.   If misses does - different type of headache.     Bp checking at home periodically - 130/85 range.     [id number] musty/moldy smell but not constantly.        Review of Systems         Objective    Vitals - Patient Reported  Pain Score: No Pain (0)    Physical Exam   GENERAL: Healthy, alert and no distress  EYES: Eyes grossly normal to inspection.  No discharge or erythema, or obvious scleral/conjunctival abnormalities.  RESP: No audible wheeze, cough, or visible cyanosis.  No visible retractions or increased work of breathing.    SKIN: Visible skin clear. No significant rash, abnormal pigmentation or lesions.  NEURO: Cranial nerves grossly intact.  Mentation and speech appropriate for age.  PSYCH: Mentation appears normal, affect normal/bright, judgement and insight intact, normal speech and appearance well-groomed.    Video-Visit Details    Type of service:  Video Visit   Originating Location (pt. Location): Home    Distant Location (provider location):  On-site  Platform used for Video Visit: Roland

## 2024-01-15 ENCOUNTER — ALLIED HEALTH/NURSE VISIT (OUTPATIENT)
Dept: FAMILY MEDICINE | Facility: CLINIC | Age: 39
End: 2024-01-15
Payer: COMMERCIAL

## 2024-01-15 DIAGNOSIS — Z23 NEED FOR VACCINATION: Primary | ICD-10-CM

## 2024-01-15 DIAGNOSIS — Z23 NEED FOR PROPHYLACTIC VACCINATION AND INOCULATION AGAINST INFLUENZA: ICD-10-CM

## 2024-01-15 DIAGNOSIS — Z23 ENCOUNTER FOR IMMUNIZATION: ICD-10-CM

## 2024-01-15 PROCEDURE — 90746 HEPB VACCINE 3 DOSE ADULT IM: CPT

## 2024-01-15 PROCEDURE — 99207 PR NO CHARGE NURSE ONLY: CPT

## 2024-01-15 PROCEDURE — 90472 IMMUNIZATION ADMIN EACH ADD: CPT

## 2024-01-15 PROCEDURE — 90686 IIV4 VACC NO PRSV 0.5 ML IM: CPT

## 2024-01-15 PROCEDURE — 90471 IMMUNIZATION ADMIN: CPT

## 2024-01-15 NOTE — PROGRESS NOTES
Prior to immunization administration, verified patients identity using patient s name and date of birth. Please see Immunization Activity for additional information.     Screening Questionnaire for Adult Immunization    Are you sick today?   No   Do you have allergies to medications, food, a vaccine component or latex?   No   Have you ever had a serious reaction after receiving a vaccination?   No   Do you have a long-term health problem with heart, lung, kidney, or metabolic disease (e.g., diabetes), asthma, a blood disorder, no spleen, complement component deficiency, a cochlear implant, or a spinal fluid leak?  Are you on long-term aspirin therapy?   No   Do you have cancer, leukemia, HIV/AIDS, or any other immune system problem?   No   Do you have a parent, brother, or sister with an immune system problem?   No   In the past 3 months, have you taken medications that affect  your immune system, such as prednisone, other steroids, or anticancer drugs; drugs for the treatment of rheumatoid arthritis, Crohn s disease, or psoriasis; or have you had radiation treatments?   No   Have you had a seizure, or a brain or other nervous system problem?   No   During the past year, have you received a transfusion of blood or blood    products, or been given immune (gamma) globulin or antiviral drug?   No   For women: Are you pregnant or is there a chance you could become       pregnant during the next month?   No   Have you received any vaccinations in the past 4 weeks?   No     Immunization questionnaire answers were all negative.    I have reviewed the following standing orders: Not Applicable; Order were already placed prior to ancillary visit    Patient instructed to remain in clinic for 15 minutes afterwards, and to report any adverse reactions.     Screening performed by Sharla Suárez MA on 1/15/2024 at 2:02 PM.

## 2024-03-04 ENCOUNTER — VIRTUAL VISIT (OUTPATIENT)
Dept: FAMILY MEDICINE | Facility: CLINIC | Age: 39
End: 2024-03-04
Payer: COMMERCIAL

## 2024-03-04 DIAGNOSIS — F90.0 ATTENTION DEFICIT HYPERACTIVITY DISORDER (ADHD), PREDOMINANTLY INATTENTIVE TYPE: Primary | ICD-10-CM

## 2024-03-04 PROCEDURE — 99213 OFFICE O/P EST LOW 20 MIN: CPT | Mod: 95 | Performed by: FAMILY MEDICINE

## 2024-03-04 RX ORDER — DEXTROAMPHETAMINE SACCHARATE, AMPHETAMINE ASPARTATE MONOHYDRATE, DEXTROAMPHETAMINE SULFATE AND AMPHETAMINE SULFATE 5; 5; 5; 5 MG/1; MG/1; MG/1; MG/1
20 CAPSULE, EXTENDED RELEASE ORAL DAILY
Qty: 30 CAPSULE | Refills: 0 | Status: SHIPPED | OUTPATIENT
Start: 2024-03-04 | End: 2024-03-08

## 2024-03-04 RX ORDER — DEXTROAMPHETAMINE SACCHARATE, AMPHETAMINE ASPARTATE MONOHYDRATE, DEXTROAMPHETAMINE SULFATE AND AMPHETAMINE SULFATE 5; 5; 5; 5 MG/1; MG/1; MG/1; MG/1
20 CAPSULE, EXTENDED RELEASE ORAL DAILY
Qty: 30 CAPSULE | Refills: 0 | Status: SHIPPED | OUTPATIENT
Start: 2024-04-04 | End: 2024-05-04

## 2024-03-04 RX ORDER — DEXTROAMPHETAMINE SACCHARATE, AMPHETAMINE ASPARTATE MONOHYDRATE, DEXTROAMPHETAMINE SULFATE AND AMPHETAMINE SULFATE 5; 5; 5; 5 MG/1; MG/1; MG/1; MG/1
20 CAPSULE, EXTENDED RELEASE ORAL DAILY
Qty: 30 CAPSULE | Refills: 0 | Status: SHIPPED | OUTPATIENT
Start: 2024-05-05 | End: 2024-06-04

## 2024-03-04 NOTE — PROGRESS NOTES
"Clarence is a 39 year old who is being evaluated via a billable video visit.      How would you like to obtain your AVS? MyChart  If the video visit is dropped, the invitation should be resent by: Text to cell phone: [phone]  Will anyone else be joining your video visit? No          Assessment & Plan     Attention deficit hyperactivity disorder (ADHD), predominantly inattentive type  Feels he may benefit from slightly higher dose.  We discussed trying 25 versus 30 mg dose.  He is somewhat reluctant with fear of side effect and we agreed to stick to the same Rx for now.  If he continues to notice his symptoms are not well-controlled we would recommend higher dose when I see you next time in person.  He agreed.  PDMP checked.  - amphetamine-dextroamphetamine (ADDERALL XR) 20 MG 24 hr capsule; Take 1 capsule (20 mg) by mouth daily for 30 days  - amphetamine-dextroamphetamine (ADDERALL XR) 20 MG 24 hr capsule; Take 1 capsule (20 mg) by mouth daily for 30 days  - amphetamine-dextroamphetamine (ADDERALL XR) 20 MG 24 hr capsule; Take 1 capsule (20 mg) by mouth daily for 30 days            BMI  Estimated body mass index is 28.89 kg/m  as calculated from the following:    Height as of 9/6/23: 1.88 m (6' 2\").    Weight as of 9/6/23: 102.1 kg (225 lb).             Subjective   Clarence is a 39 year old, presenting for the following health issues:  Recheck Medication      3/4/2024     8:20 AM   Additional Questions   Roomed by Alyse MARIN     History of Present Illness       Reason for visit:  Medication Check in    He eats 2-3 servings of fruits and vegetables daily.He consumes 2 sweetened beverage(s) daily.He exercises with enough effort to increase his heart rate 10 to 19 minutes per day.  He exercises with enough effort to increase his heart rate 4 days per week.   He is taking medications regularly.     Nothing new. Overall doing well. Work is good.     Now and then anxiety but overall doing well. Doing some breathing " "technique.     Adderall - feels it is helping. Not noticing a whole lot of difference. Helps with good flow at work.   Skipped dose in the past - more groggy feelings. If skips for days - notices more \"lost\" at work.           Objective           Vitals:  No vitals were obtained today due to virtual visit.    Physical Exam   GENERAL: alert and no distress  EYES: Eyes grossly normal to inspection.  No discharge or erythema, or obvious scleral/conjunctival abnormalities.  RESP: No audible wheeze, cough, or visible cyanosis.    SKIN: Visible skin clear. No significant rash, abnormal pigmentation or lesions.  NEURO: Cranial nerves grossly intact.  Mentation and speech appropriate for age.  PSYCH: Appropriate affect, tone, and pace of words          Video-Visit Details    Type of service:  Video Visit     Originating Location (pt. Location): Home    Distant Location (provider location):  On-site  Platform used for Video Visit: Roland  Signed Electronically by: aTy Christiansen MD, MD    "

## 2024-03-11 ENCOUNTER — TELEPHONE (OUTPATIENT)
Dept: FAMILY MEDICINE | Facility: CLINIC | Age: 39
End: 2024-03-11

## 2024-03-11 NOTE — TELEPHONE ENCOUNTER
Prior Authorization Retail Medication Request    Medication/Dose: ADDERALL XR 20 MG 24 hr capsule   Diagnosis and ICD code (if different than what is on RX):  Attention deficit hyperactivity disorder (ADHD), predominantly inattentive type     Insurance   Primary: [facility]   Insurance ID:  [id number]     Pharmacy Information (if different than what is on RX)  Name:  [facility]  Phone:  [phone]   Fax:[phone]

## 2024-03-13 ENCOUNTER — MYC MEDICAL ADVICE (OUTPATIENT)
Dept: FAMILY MEDICINE | Facility: CLINIC | Age: 39
End: 2024-03-13
Payer: COMMERCIAL

## 2024-03-21 NOTE — TELEPHONE ENCOUNTER
Prior Authorization Approval    Medication: ADDERALL XR 20 MG PO CP24  Authorization Effective Date: 3/12/2024  Authorization Expiration Date: 7/12/2024  Approved Dose/Quantity:   Reference #:     Insurance Company: [employer] - Phone [phone] Fax [phone]  Expected CoPay: $    CoPay Card Available:      Financial Assistance Needed:   Which Pharmacy is filling the prescription: [id number] - [place], [place] - [address] AT [facility] [address]  Pharmacy Notified: Yes  Patient Notified: Per pharmacy, pt picked up on 3/13/24    PA approved via phone. Case# [id number].

## 2024-06-09 ASSESSMENT — PATIENT HEALTH QUESTIONNAIRE - PHQ9
SUM OF ALL RESPONSES TO PHQ QUESTIONS 1-9: 3
SUM OF ALL RESPONSES TO PHQ QUESTIONS 1-9: 3
10. IF YOU CHECKED OFF ANY PROBLEMS, HOW DIFFICULT HAVE THESE PROBLEMS MADE IT FOR YOU TO DO YOUR WORK, TAKE CARE OF THINGS AT HOME, OR GET ALONG WITH OTHER PEOPLE: SOMEWHAT DIFFICULT

## 2024-06-10 ENCOUNTER — OFFICE VISIT (OUTPATIENT)
Dept: FAMILY MEDICINE | Facility: CLINIC | Age: 39
End: 2024-06-10
Attending: FAMILY MEDICINE
Payer: COMMERCIAL

## 2024-06-10 VITALS
DIASTOLIC BLOOD PRESSURE: 78 MMHG | RESPIRATION RATE: 21 BRPM | HEIGHT: 74 IN | TEMPERATURE: 97.6 F | OXYGEN SATURATION: 98 % | HEART RATE: 70 BPM | BODY MASS INDEX: 28.43 KG/M2 | WEIGHT: 221.5 LBS | SYSTOLIC BLOOD PRESSURE: 132 MMHG

## 2024-06-10 DIAGNOSIS — F90.0 ATTENTION DEFICIT HYPERACTIVITY DISORDER (ADHD), PREDOMINANTLY INATTENTIVE TYPE: ICD-10-CM

## 2024-06-10 PROCEDURE — 99213 OFFICE O/P EST LOW 20 MIN: CPT | Performed by: FAMILY MEDICINE

## 2024-06-10 PROCEDURE — G2211 COMPLEX E/M VISIT ADD ON: HCPCS | Performed by: FAMILY MEDICINE

## 2024-06-10 RX ORDER — DEXTROAMPHETAMINE SULFATE, DEXTROAMPHETAMINE SACCHARATE, AMPHETAMINE SULFATE AND AMPHETAMINE ASPARTATE 5; 5; 5; 5 MG/1; MG/1; MG/1; MG/1
20 CAPSULE, EXTENDED RELEASE ORAL DAILY
Qty: 30 CAPSULE | Refills: 0 | Status: CANCELLED | OUTPATIENT
Start: 2024-06-10

## 2024-06-10 RX ORDER — DEXTROAMPHETAMINE SACCHARATE, AMPHETAMINE ASPARTATE MONOHYDRATE, DEXTROAMPHETAMINE SULFATE AND AMPHETAMINE SULFATE 5; 5; 5; 5 MG/1; MG/1; MG/1; MG/1
20 CAPSULE, EXTENDED RELEASE ORAL DAILY
Qty: 30 CAPSULE | Refills: 0 | Status: SHIPPED | OUTPATIENT
Start: 2024-07-10 | End: 2024-08-09

## 2024-06-10 RX ORDER — DEXTROAMPHETAMINE SACCHARATE, AMPHETAMINE ASPARTATE MONOHYDRATE, DEXTROAMPHETAMINE SULFATE AND AMPHETAMINE SULFATE 5; 5; 5; 5 MG/1; MG/1; MG/1; MG/1
20 CAPSULE, EXTENDED RELEASE ORAL DAILY
Qty: 30 CAPSULE | Refills: 0 | Status: SHIPPED | OUTPATIENT
Start: 2024-08-09 | End: 2024-09-04

## 2024-06-10 RX ORDER — DEXTROAMPHETAMINE SACCHARATE, AMPHETAMINE ASPARTATE MONOHYDRATE, DEXTROAMPHETAMINE SULFATE AND AMPHETAMINE SULFATE 5; 5; 5; 5 MG/1; MG/1; MG/1; MG/1
20 CAPSULE, EXTENDED RELEASE ORAL DAILY
Qty: 30 CAPSULE | Refills: 0 | Status: SHIPPED | OUTPATIENT
Start: 2024-06-10 | End: 2024-07-10

## 2024-06-10 ASSESSMENT — PAIN SCALES - GENERAL: PAINLEVEL: NO PAIN (0)

## 2024-06-10 NOTE — PROGRESS NOTES
"  Assessment & Plan     Attention deficit hyperactivity disorder (ADHD), predominantly inattentive type  Overall doing well.  PDMP checked.  Some social stressors but does not think any intervention needed.  - amphetamine-dextroamphetamine (ADDERALL XR) 20 MG 24 hr capsule; Take 1 capsule (20 mg) by mouth daily for 30 days  - amphetamine-dextroamphetamine (ADDERALL XR) 20 MG 24 hr capsule; Take 1 capsule (20 mg) by mouth daily for 30 days  - amphetamine-dextroamphetamine (ADDERALL XR) 20 MG 24 hr capsule; Take 1 capsule (20 mg) by mouth daily for 30 days          BMI  Estimated body mass index is 28.44 kg/m  as calculated from the following:    Height as of this encounter: 1.88 m (6' 2\").    Weight as of this encounter: 100.5 kg (221 lb 8 oz).             Subjective   Clarence is a 39 year old, presenting for the following health issues:  Follow Up        6/10/2024     7:04 AM   Additional Questions   Roomed by COLIN Chauhan   Accompanied by self         6/10/2024     7:04 AM   Patient Reported Additional Medications   Patient reports taking the following new medications none     History of Present Illness       Reason for visit:  Medication review/assessment    He eats 2-3 servings of fruits and vegetables daily.He consumes 1 sweetened beverage(s) daily.He exercises with enough effort to increase his heart rate 10 to 19 minutes per day.  He exercises with enough effort to increase his heart rate 4 days per week.   He is taking medications regularly.     Doing well.     Home bp much better 120/76.     Feels current dose of adderall is working well.     Doing therapy. Long term partner moved out and some stress. Does not think any need for meds yet.             Objective    /78 (BP Location: Right arm, Patient Position: Sitting, Cuff Size: Adult Regular)   Pulse 70   Temp 97.6  F (36.4  C) (Temporal)   Resp 21   Ht 1.88 m (6' 2\")   Wt 100.5 kg (221 lb 8 oz)   SpO2 98%   BMI 28.44 kg/m    Body mass index is " 28.44 kg/m .  Physical Exam               The longitudinal plan of care for the diagnosis(es)/condition(s) as documented were addressed during this visit. Due to the added complexity in care, I will continue to support Clarence in the subsequent management and with ongoing continuity of care.  Signed Electronically by: Tay Christiansen MD, MD

## 2024-08-13 ENCOUNTER — MYC MEDICAL ADVICE (OUTPATIENT)
Dept: FAMILY MEDICINE | Facility: CLINIC | Age: 39
End: 2024-08-13
Payer: COMMERCIAL

## 2024-09-04 ENCOUNTER — OFFICE VISIT (OUTPATIENT)
Dept: FAMILY MEDICINE | Facility: CLINIC | Age: 39
End: 2024-09-04
Payer: COMMERCIAL

## 2024-09-04 VITALS
DIASTOLIC BLOOD PRESSURE: 86 MMHG | BODY MASS INDEX: 27.76 KG/M2 | RESPIRATION RATE: 18 BRPM | HEART RATE: 85 BPM | OXYGEN SATURATION: 99 % | HEIGHT: 74 IN | SYSTOLIC BLOOD PRESSURE: 130 MMHG | WEIGHT: 216.3 LBS | TEMPERATURE: 97 F

## 2024-09-04 DIAGNOSIS — F90.0 ATTENTION DEFICIT HYPERACTIVITY DISORDER (ADHD), PREDOMINANTLY INATTENTIVE TYPE: ICD-10-CM

## 2024-09-04 DIAGNOSIS — Z13.220 SCREENING FOR HYPERLIPIDEMIA: ICD-10-CM

## 2024-09-04 DIAGNOSIS — Z00.00 ROUTINE GENERAL MEDICAL EXAMINATION AT A HEALTH CARE FACILITY: Primary | ICD-10-CM

## 2024-09-04 DIAGNOSIS — Z11.3 SCREENING FOR STDS (SEXUALLY TRANSMITTED DISEASES): ICD-10-CM

## 2024-09-04 DIAGNOSIS — Z13.1 SCREENING FOR DIABETES MELLITUS: ICD-10-CM

## 2024-09-04 LAB — T PALLIDUM AB SER QL: NONREACTIVE

## 2024-09-04 PROCEDURE — 82947 ASSAY GLUCOSE BLOOD QUANT: CPT | Performed by: FAMILY MEDICINE

## 2024-09-04 PROCEDURE — 87340 HEPATITIS B SURFACE AG IA: CPT | Performed by: FAMILY MEDICINE

## 2024-09-04 PROCEDURE — 80061 LIPID PANEL: CPT | Performed by: FAMILY MEDICINE

## 2024-09-04 PROCEDURE — 99214 OFFICE O/P EST MOD 30 MIN: CPT | Mod: 25 | Performed by: FAMILY MEDICINE

## 2024-09-04 PROCEDURE — 86803 HEPATITIS C AB TEST: CPT | Performed by: FAMILY MEDICINE

## 2024-09-04 PROCEDURE — 99395 PREV VISIT EST AGE 18-39: CPT | Performed by: FAMILY MEDICINE

## 2024-09-04 PROCEDURE — 86706 HEP B SURFACE ANTIBODY: CPT | Performed by: FAMILY MEDICINE

## 2024-09-04 PROCEDURE — 86780 TREPONEMA PALLIDUM: CPT | Performed by: FAMILY MEDICINE

## 2024-09-04 PROCEDURE — 87389 HIV-1 AG W/HIV-1&-2 AB AG IA: CPT | Performed by: FAMILY MEDICINE

## 2024-09-04 PROCEDURE — 36415 COLL VENOUS BLD VENIPUNCTURE: CPT | Performed by: FAMILY MEDICINE

## 2024-09-04 RX ORDER — DEXTROAMPHETAMINE SACCHARATE, AMPHETAMINE ASPARTATE, DEXTROAMPHETAMINE SULFATE AND AMPHETAMINE SULFATE 1.25; 1.25; 1.25; 1.25 MG/1; MG/1; MG/1; MG/1
TABLET ORAL
Qty: 30 TABLET | Refills: 0 | Status: SHIPPED | OUTPATIENT
Start: 2024-09-04

## 2024-09-04 RX ORDER — DEXTROAMPHETAMINE SACCHARATE, AMPHETAMINE ASPARTATE MONOHYDRATE, DEXTROAMPHETAMINE SULFATE AND AMPHETAMINE SULFATE 5; 5; 5; 5 MG/1; MG/1; MG/1; MG/1
20 CAPSULE, EXTENDED RELEASE ORAL DAILY
Qty: 30 CAPSULE | Refills: 0 | Status: SHIPPED | OUTPATIENT
Start: 2024-10-27 | End: 2024-11-26

## 2024-09-04 RX ORDER — DEXTROAMPHETAMINE SACCHARATE, AMPHETAMINE ASPARTATE MONOHYDRATE, DEXTROAMPHETAMINE SULFATE AND AMPHETAMINE SULFATE 5; 5; 5; 5 MG/1; MG/1; MG/1; MG/1
20 CAPSULE, EXTENDED RELEASE ORAL DAILY
Qty: 30 CAPSULE | Refills: 0 | Status: SHIPPED | OUTPATIENT
Start: 2024-11-26 | End: 2024-12-26

## 2024-09-04 RX ORDER — DEXTROAMPHETAMINE SACCHARATE, AMPHETAMINE ASPARTATE MONOHYDRATE, DEXTROAMPHETAMINE SULFATE AND AMPHETAMINE SULFATE 5; 5; 5; 5 MG/1; MG/1; MG/1; MG/1
20 CAPSULE, EXTENDED RELEASE ORAL DAILY
Qty: 30 CAPSULE | Refills: 0 | Status: SHIPPED | OUTPATIENT
Start: 2024-09-27 | End: 2024-10-27

## 2024-09-04 ASSESSMENT — PAIN SCALES - GENERAL: PAINLEVEL: NO PAIN (0)

## 2024-09-04 NOTE — PROGRESS NOTES
Preventive Care Visit  [facility]  Tay Vanessa Christiansen MD, MD, Family Medicine  Sep 4, 2024      Assessment & Plan     Routine general medical examination at a health care facility       Attention deficit hyperactivity disorder (ADHD), predominantly inattentive type  Noticing medication is not lasting too long.  We discussed either going up on the dose or add afternoon dose for immediate release Rx.  We agreed to add afternoon dose.  Side effects discussed.  Discussed no need to use it every day.  Stick with the same dose of extended release Rx.  Will only do 1 month of immediate release 5 mg dose for now.  Will notify me if needs refill.  - amphetamine-dextroamphetamine (ADDERALL XR) 20 MG 24 hr capsule; Take 1 capsule (20 mg) by mouth daily. Do not start before September 27, 2024.  - amphetamine-dextroamphetamine (ADDERALL XR) 20 MG 24 hr capsule; Take 1 capsule (20 mg) by mouth daily. Do not start before October 27, 2024.  - amphetamine-dextroamphetamine (ADDERALL XR) 20 MG 24 hr capsule; Take 1 capsule (20 mg) by mouth daily. Do not start before November 26, 2024.  - amphetamine-dextroamphetamine (ADDERALL) 5 MG tablet; Take 1 pill around 1 pm    Screening for hyperlipidemia     - Lipid panel reflex to direct LDL Fasting; Future  - Lipid panel reflex to direct LDL Fasting    Screening for diabetes mellitus     - Glucose; Future  - Glucose    Screening for STDs (sexually transmitted diseases)     - HIV Antigen Antibody Combo Cascade; Future  - Treponema Abs w Reflex to RPR and Titer; Future  - Hepatitis B surface antigen; Future  - Hepatitis B Surface Antibody; Future  - Hepatitis C Screen Reflex to HCV RNA Quant and Genotype; Future  - HIV Antigen Antibody Combo Cascade  - Treponema Abs w Reflex to RPR and Titer  - Hepatitis B surface antigen  - Hepatitis B Surface Antibody  - Hepatitis C Screen Reflex to HCV RNA Quant and Genotype            BMI  Estimated body mass index is  "28.15 kg/m  as calculated from the following:    Height as of this encounter: 1.867 m (6' 1.5\").    Weight as of this encounter: 98.1 kg (216 lb 4.8 oz).   Weight management plan: Discussed healthy diet and exercise guidelines    Counseling  Appropriate preventive services were addressed with this patient via screening, questionnaire, or discussion as appropriate for fall prevention, nutrition, physical activity, Tobacco-use cessation, social engagement, weight loss and cognition.  Checklist reviewing preventive services available has been given to the patient.  Reviewed patient's diet, addressing concerns and/or questions.   He is at risk for lack of exercise and has been provided with information to increase physical activity for the benefit of his well-being.           Subjective   Clarence is a 39 year old, presenting for the following:  Physical        9/4/2024     2:56 PM   Additional Questions   Roomed by Alyse metzger        Health Care Directive  Patient does not have a Health Care Directive or Living Will: Discussed advance care planning with patient; however, patient declined at this time.    HPI    Home bp readings 120/78 range.     Weight is going down. More active. Finding more time to bike and run few times per week and home exercise too.     Adderall - sometime in afternoon noticing medication is wearing off.     Soemtime putting off things that he needs to complete.      No concern of gonorrhea and chlamydia but would like to have blood work done for stds.         9/3/2024   General Health   How would you rate your overall physical health? Good   Feel stress (tense, anxious, or unable to sleep) Not at all            9/3/2024   Nutrition   Three or more servings of calcium each day? (!) I DON'T KNOW   Diet: Regular (no restrictions)   How many servings of fruit and vegetables per day? (!) 2-3   How many sweetened beverages each day? (!) 2            9/3/2024   Exercise   Days per week of moderate/strenous " "exercise 3 days   Average minutes spent exercising at this level 30 min            9/3/2024   Social Factors   Frequency of gathering with friends or relatives Three times a week   Worry food won't last until get money to buy more No   Food not last or not have enough money for food? No   Do you have housing? (Housing is defined as stable permanent housing and does not include staying ouside in a car, in a tent, in an abandoned building, in an overnight shelter, or couch-surfing.) Yes   Are you worried about losing your housing? No   Lack of transportation? No   Unable to get utilities (heat,electricity)? Yes   Want help with housing or utility concern? No      (!) FINANCIAL RESOURCE STRAIN CONCERN      9/3/2024   Dental   Dentist two times every year? Yes            9/3/2024   TB Screening   Were you born outside of the [place]? No            Today's PHQ-9 Score:       6/9/2024    12:40 PM   PHQ-9 SCORE   PHQ-9 Total Score MyChart 3 (Minimal depression)   PHQ-9 Total Score 3           9/3/2024   Substance Use   Alcohol more than 3/day or more than 7/wk No   Do you use any other substances recreationally? No        Social History     Tobacco Use    Smoking status: Never     Passive exposure: Never    Smokeless tobacco: Never   Vaping Use    Vaping status: Never Used   Substance Use Topics    Alcohol use: Yes     Comment: 2 drinks a week     Drug use: No           9/3/2024   STI Screening   New sexual partner(s) since last STI/HIV test? (!) YES              9/3/2024   Contraception/Family Planning   Questions about contraception or family planning No           Reviewed and updated as needed this visit by Provider                          Objective    Exam  /86 (BP Location: Right arm, Patient Position: Sitting, Cuff Size: Adult Regular)   Pulse 85   Temp 97  F (36.1  C) (Temporal)   Resp 18   Ht 1.867 m (6' 1.5\")   Wt 98.1 kg (216 lb 4.8 oz)   SpO2 99%   BMI 28.15 kg/m     Estimated body mass index is 28.15 " "kg/m  as calculated from the following:    Height as of this encounter: 1.867 m (6' 1.5\").    Weight as of this encounter: 98.1 kg (216 lb 4.8 oz).    Physical Exam  GENERAL: alert and no distress  EYES: Eyes grossly normal to inspection, PERRL and conjunctivae and sclerae normal  HENT: ear canals and TM's normal, nose and mouth without ulcers or lesions  NECK: no adenopathy, no asymmetry, masses, or scars  RESP: lungs clear to auscultation - no rales, rhonchi or wheezes  CV: regular rate and rhythm, normal S1 S2, no S3 or S4, no murmur, click or rub, no peripheral edema  ABDOMEN: soft, nontender, no hepatosplenomegaly, no masses and bowel sounds normal   (male): normal male genitalia without lesions or urethral discharge, no hernia  MS: no gross musculoskeletal defects noted, no edema  SKIN: no suspicious lesions or rashes, skin picking scars.   NEURO: Normal strength and tone, mentation intact and speech normal  PSYCH: mentation appears normal, affect normal/bright    Signed Electronically by: Tay Christiansen MD    "

## 2024-09-04 NOTE — PATIENT INSTRUCTIONS
Patient Education   Preventive Care Advice   This is general advice given by our system to help you stay healthy. However, your care team may have specific advice just for you. Please talk to your care team about your preventive care needs.  Nutrition  Eat 5 or more servings of fruits and vegetables each day.  Try wheat bread, brown rice and whole grain pasta (instead of white bread, rice, and pasta).  Get enough calcium and vitamin D. Check the label on foods and aim for 100% of the RDA (recommended daily allowance).  Lifestyle  Exercise at least 150 minutes each week  (30 minutes a day, 5 days a week).  Do muscle strengthening activities 2 days a week. These help control your weight and prevent disease.  No smoking.  Wear sunscreen to prevent skin cancer.  Have a dental exam and cleaning every 6 months.  Yearly exams  See your health care team every year to talk about:  Any changes in your health.  Any medicines your care team has prescribed.  Preventive care, family planning, and ways to prevent chronic diseases.  Shots (vaccines)   HPV shots (up to age 26), if you've never had them before.  Hepatitis B shots (up to age 59), if you've never had them before.  COVID-19 shot: Get this shot when it's due.  Flu shot: Get a flu shot every year.  Tetanus shot: Get a tetanus shot every 10 years.  Pneumococcal, hepatitis A, and RSV shots: Ask your care team if you need these based on your risk.  Shingles shot (for age 50 and up)  General health tests  Diabetes screening:  Starting at age 35, Get screened for diabetes at least every 3 years.  If you are younger than age 35, ask your care team if you should be screened for diabetes.  Cholesterol test: At age 39, start having a cholesterol test every 5 years, or more often if advised.  Bone density scan (DEXA): At age 50, ask your care team if you should have this scan for osteoporosis (brittle bones).  Hepatitis C: Get tested at least once in your life.  STIs (sexually  transmitted infections)  Before age 24: Ask your care team if you should be screened for STIs.  After age 24: Get screened for STIs if you're at risk. You are at risk for STIs (including HIV) if:  You are sexually active with more than one person.  You don't use condoms every time.  You or a partner was diagnosed with a sexually transmitted infection.  If you are at risk for HIV, ask about PrEP medicine to prevent HIV.  Get tested for HIV at least once in your life, whether you are at risk for HIV or not.  Cancer screening tests  Cervical cancer screening: If you have a cervix, begin getting regular cervical cancer screening tests starting at age 21.  Breast cancer scan (mammogram): If you've ever had breasts, begin having regular mammograms starting at age 40. This is a scan to check for breast cancer.  Colon cancer screening: It is important to start screening for colon cancer at age 45.  Have a colonoscopy test every 10 years (or more often if you're at risk) Or, ask your provider about stool tests like a FIT test every year or Cologuard test every 3 years.  To learn more about your testing options, visit:   .  For help making a decision, visit:   https://bit.ly/bo72065.  Prostate cancer screening test: If you have a prostate, ask your care team if a prostate cancer screening test (PSA) at age 55 is right for you.  Lung cancer screening: If you are a current or former smoker ages 50 to 80, ask your care team if ongoing lung cancer screenings are right for you.  For informational purposes only. Not to replace the advice of your health care provider. Copyright   2023 [facility]. All rights reserved. Clinically reviewed by the [facility]. [employer] 134719 - REV 01/24.  Safer Sex: Care Instructions  Overview  Safer sex is a way to reduce your risk of getting a sexually transmitted infection (STI). It can also help prevent pregnancy.  Several products can help you practice  safer sex and reduce your chance of STIs. One of the best is a condom. There are internal and external condoms. You can use a special rubber sheet (dental dam) for protection during oral sex. Disposable gloves can keep your hands from touching blood, semen, or other body fluids that can carry infections.  Remember that birth control methods such as diaphragms, IUDs, foams, and birth control pills do not stop you from getting STIs.  Follow-up care is a key part of your treatment and safety. Be sure to make and go to all appointments, and call your doctor if you are having problems. It's also a good idea to know your test results and keep a list of the medicines you take.  How can you care for yourself at home?  Think about getting vaccinated to help prevent hepatitis A, hepatitis B, and human papillomavirus (HPV). They can be spread through sex.  Use a condom every time you have sex. Use an external condom, which goes on the penis. Or use an internal condom, which goes into the vagina or anus.  Make sure you use the right size external condom. A condom that's too small can break easily. A condom that's too big can slip off during sex.  Use a new condom each time you have sex. Be careful not to poke a hole in the condom when you open the wrapper.  Don't use an internal condom and an external condom at the same time.  Never use petroleum jelly (such as Vaseline), grease, hand lotion, baby oil, or anything with oil in it. These products can make holes in the condom.  After intercourse, hold the edge of the condom as you remove it. This will help keep semen from spilling out of the condom.  Do not have sex with anyone who has symptoms of an STI, such as sores on the genitals or mouth.  Do not drink a lot of alcohol or use drugs before sex.  Limit your sex partners. Sex with one partner who has sex only with you can reduce your risk of getting an STI.  Don't share sex toys. But if you do share them, use a condom and clean  "the sex toys between each use.  Talk to any partners before you have sex. Talk about what you feel comfortable with and whether you have any boundaries with sex. And find out if your partner or partners may be at risk for any STI. Keep in mind that a person may be able to spread an STI even if they do not have symptoms. You and any partners may want to get tested for STIs.  Where can you learn more?  Go to https://www.Porph[employer].net/patiented  Enter B608 in the search box to learn more about \"Safer Sex: Care Instructions.\"  Current as of: November 27, 2023               Content Version: 14.0    3377-1910 [employer].   Care instructions adapted under license by your healthcare professional. If you have questions about a medical condition or this instruction, always ask your healthcare professional. [employer] disclaims any warranty or liability for your use of this information.         "

## 2024-09-05 LAB
CHOLEST SERPL-MCNC: 177 MG/DL
FASTING STATUS PATIENT QL REPORTED: NO
FASTING STATUS PATIENT QL REPORTED: NO
GLUCOSE SERPL-MCNC: 86 MG/DL (ref 70–99)
HBV SURFACE AB SERPL IA-ACNC: >1000 M[IU]/ML
HBV SURFACE AB SERPL IA-ACNC: REACTIVE M[IU]/ML
HBV SURFACE AG SERPL QL IA: NONREACTIVE
HCV AB SERPL QL IA: NONREACTIVE
HDLC SERPL-MCNC: 37 MG/DL
HIV 1+2 AB+HIV1 P24 AG SERPL QL IA: NONREACTIVE
LDLC SERPL CALC-MCNC: 99 MG/DL
NONHDLC SERPL-MCNC: 140 MG/DL
TRIGL SERPL-MCNC: 204 MG/DL

## 2024-09-30 ENCOUNTER — TELEPHONE (OUTPATIENT)
Dept: FAMILY MEDICINE | Facility: CLINIC | Age: 39
End: 2024-09-30
Payer: COMMERCIAL

## 2024-09-30 ENCOUNTER — MYC MEDICAL ADVICE (OUTPATIENT)
Dept: FAMILY MEDICINE | Facility: CLINIC | Age: 39
End: 2024-09-30
Payer: COMMERCIAL

## 2024-09-30 NOTE — TELEPHONE ENCOUNTER
Prior Authorization Retail Medication Request    Medication/Dose: Adderall XR 20MG ER Capsules  Diagnosis and ICD code (if different than what is on RX):   [F90.0]     Insurance   Primary: [employer]   Insurance ID:  [id number]     Pharmacy Information (if different than what is on RX)  Name:    [id number] - [place], [place] - [facility] [address] AT [facility] [address]     Phone:    [phone]       Fax:  [phone]

## 2024-10-02 NOTE — TELEPHONE ENCOUNTER
Writer responded via [employer].  BRADLEY CastellanoN, RN-BC  [facility]

## 2024-10-03 NOTE — TELEPHONE ENCOUNTER
PA Initiation    Medication: ADDERALL XR 20 MG PO CP24  Insurance Company: [employer] - Phone [phone] Fax [phone]  Pharmacy Filling the Rx: [facility] #[id number] - [place], [place] - [address] AT [facility] [address]  Filling Pharmacy Phone: [phone]  Filling Pharmacy Fax: [phone]  Start Date: 10/3/2024

## 2024-10-03 NOTE — TELEPHONE ENCOUNTER
Patient calling regarding his PA on Adderall (brand name), as he is currently out of medication. Discussed timeline to PA response and options in the interim - could ask clinician to prescribe an alternate, could pay out of pocket/utilize GoodRx, etc.    Writer replied to patient via [employer].    BRADLEY FinchN, RN (she/her)  [facility] RN

## 2024-10-04 NOTE — TELEPHONE ENCOUNTER
Prior Authorization Approval    Medication: ADDERALL XR 20 MG PO CP24  Authorization Effective Date: 10/3/2024  Authorization Expiration Date: 4/3/2025  Approved Dose/Quantity:   Reference #:     Insurance Company: [employer] - Phone [phone] Fax [phone]  Expected CoPay: $    CoPay Card Available:      Financial Assistance Needed:   Which Pharmacy is filling the prescription: [id number] - [place], [place] - [address] AT [facility] [address]  Pharmacy Notified: YES  Patient Notified: **Instructed pharmacy to notify patient when script is ready to /ship.**

## 2024-10-22 ENCOUNTER — MYC REFILL (OUTPATIENT)
Dept: FAMILY MEDICINE | Facility: CLINIC | Age: 39
End: 2024-10-22
Payer: COMMERCIAL

## 2024-10-22 DIAGNOSIS — F90.0 ATTENTION DEFICIT HYPERACTIVITY DISORDER (ADHD), PREDOMINANTLY INATTENTIVE TYPE: ICD-10-CM

## 2024-10-23 RX ORDER — DEXTROAMPHETAMINE SACCHARATE, AMPHETAMINE ASPARTATE, DEXTROAMPHETAMINE SULFATE AND AMPHETAMINE SULFATE 1.25; 1.25; 1.25; 1.25 MG/1; MG/1; MG/1; MG/1
TABLET ORAL
Qty: 30 TABLET | Refills: 0 | Status: SHIPPED | OUTPATIENT
Start: 2024-10-23

## 2024-11-01 ASSESSMENT — PATIENT HEALTH QUESTIONNAIRE - PHQ9: SUM OF ALL RESPONSES TO PHQ QUESTIONS 1-9: 8

## 2024-12-03 ASSESSMENT — PATIENT HEALTH QUESTIONNAIRE - PHQ9
10. IF YOU CHECKED OFF ANY PROBLEMS, HOW DIFFICULT HAVE THESE PROBLEMS MADE IT FOR YOU TO DO YOUR WORK, TAKE CARE OF THINGS AT HOME, OR GET ALONG WITH OTHER PEOPLE: SOMEWHAT DIFFICULT
SUM OF ALL RESPONSES TO PHQ QUESTIONS 1-9: 6
SUM OF ALL RESPONSES TO PHQ QUESTIONS 1-9: 6

## 2024-12-04 ENCOUNTER — VIRTUAL VISIT (OUTPATIENT)
Dept: FAMILY MEDICINE | Facility: CLINIC | Age: 39
End: 2024-12-04
Attending: FAMILY MEDICINE
Payer: COMMERCIAL

## 2024-12-04 DIAGNOSIS — F90.0 ATTENTION DEFICIT HYPERACTIVITY DISORDER (ADHD), PREDOMINANTLY INATTENTIVE TYPE: ICD-10-CM

## 2024-12-04 DIAGNOSIS — E78.1 HYPERTRIGLYCERIDEMIA: Primary | ICD-10-CM

## 2024-12-04 DIAGNOSIS — N46.9 MALE FERTILITY PROBLEMS: ICD-10-CM

## 2024-12-04 PROCEDURE — G2211 COMPLEX E/M VISIT ADD ON: HCPCS | Mod: 95 | Performed by: FAMILY MEDICINE

## 2024-12-04 PROCEDURE — 99214 OFFICE O/P EST MOD 30 MIN: CPT | Mod: 95 | Performed by: FAMILY MEDICINE

## 2024-12-04 RX ORDER — DEXTROAMPHETAMINE SACCHARATE, AMPHETAMINE ASPARTATE, DEXTROAMPHETAMINE SULFATE AND AMPHETAMINE SULFATE 1.25; 1.25; 1.25; 1.25 MG/1; MG/1; MG/1; MG/1
TABLET ORAL
Qty: 30 TABLET | Refills: 0 | Status: SHIPPED | OUTPATIENT
Start: 2024-12-04

## 2024-12-04 RX ORDER — DEXTROAMPHETAMINE SACCHARATE, AMPHETAMINE ASPARTATE MONOHYDRATE, DEXTROAMPHETAMINE SULFATE AND AMPHETAMINE SULFATE 5; 5; 5; 5 MG/1; MG/1; MG/1; MG/1
20 CAPSULE, EXTENDED RELEASE ORAL DAILY
Qty: 30 CAPSULE | Refills: 0 | Status: SHIPPED | OUTPATIENT
Start: 2025-03-04 | End: 2025-04-03

## 2024-12-04 RX ORDER — DEXTROAMPHETAMINE SACCHARATE, AMPHETAMINE ASPARTATE, DEXTROAMPHETAMINE SULFATE AND AMPHETAMINE SULFATE 1.25; 1.25; 1.25; 1.25 MG/1; MG/1; MG/1; MG/1
TABLET ORAL
Qty: 30 TABLET | Refills: 0 | Status: SHIPPED | OUTPATIENT
Start: 2025-01-03

## 2024-12-04 RX ORDER — DEXTROAMPHETAMINE SACCHARATE, AMPHETAMINE ASPARTATE MONOHYDRATE, DEXTROAMPHETAMINE SULFATE AND AMPHETAMINE SULFATE 5; 5; 5; 5 MG/1; MG/1; MG/1; MG/1
20 CAPSULE, EXTENDED RELEASE ORAL DAILY
Qty: 30 CAPSULE | Refills: 0 | Status: SHIPPED | OUTPATIENT
Start: 2025-02-02 | End: 2025-03-04

## 2024-12-04 RX ORDER — DEXTROAMPHETAMINE SACCHARATE, AMPHETAMINE ASPARTATE MONOHYDRATE, DEXTROAMPHETAMINE SULFATE AND AMPHETAMINE SULFATE 5; 5; 5; 5 MG/1; MG/1; MG/1; MG/1
20 CAPSULE, EXTENDED RELEASE ORAL DAILY
Qty: 30 CAPSULE | Refills: 0 | Status: SHIPPED | OUTPATIENT
Start: 2025-01-03 | End: 2025-02-02

## 2024-12-04 NOTE — PROGRESS NOTES
Clarence is a 39 year old who is being evaluated via a billable video visit.    How would you like to obtain your AVS? MyChart  If the video visit is dropped, the invitation should be resent by: Send to e-mail at: [email or web address]  Will anyone else be joining your video visit? No      Assessment & Plan     Hypertriglyceridemia  We discussed diet, exercise, carbohydrate reduction.  We also discussed fish oil specially with his family history.    Male fertility problems  He would like to be proactive.  He has a history of varicocele.  We discussed it may affect fertility but it is reasonable to try for up to a year before considering fertility evaluation.  We discussed we can consider asking urologist for varicocele evaluation and he would like to pursue that.  - Adult Urology  Referral; Future    Attention deficit hyperactivity disorder (ADHD), predominantly inattentive type  3 months of 20 mg XR given.  2 months of 5 mg immediate release given.  He does not use immediate release on regular basis and it should last around 3 months but if he needs a refill okay to refill without seeing.  - amphetamine-dextroamphetamine (ADDERALL XR) 20 MG 24 hr capsule; Take 1 capsule (20 mg) by mouth daily.  - amphetamine-dextroamphetamine (ADDERALL XR) 20 MG 24 hr capsule; Take 1 capsule (20 mg) by mouth daily.  - amphetamine-dextroamphetamine (ADDERALL XR) 20 MG 24 hr capsule; Take 1 capsule (20 mg) by mouth daily.  - amphetamine-dextroamphetamine (ADDERALL) 5 MG tablet; Take 1 pill around 1 pm  - amphetamine-dextroamphetamine (ADDERALL) 5 MG tablet; Take 1 pill at 1 pm.      Follow-up in 3 to 4 months.  In person visit.    Subjective   Clarence is a 39 year old, presenting for the following health issues:  Recheck Medication        12/4/2024     3:11 PM   Additional Questions   Roomed by Edda Munguia   Accompanied by Self     HPI     No new changes in health. Overall doing well. Not active as was in summer. Looking to get back  "to gym.   122/85.  Father had stents put in last weekend - unstable angina. Grandfather high triglycerides.     Curious about fertility. Left testicle varicocele. Had US at that time. Right side is fine. Will be in a new relationship. Wondering if he should be proactive.     Adhd - 20mg xr most morning. Immediate release taking some days per week. Sometime hard to get xr and immediate release helps.           Objective    Vitals - Patient Reported  Weight (Patient Reported): 99.8 kg (220 lb)  Height (Patient Reported): 188 cm (6' 2\")  BMI (Based on Pt Reported Ht/Wt): 28.25  Pain Score: No Pain (0)        Physical Exam   GENERAL: alert and no distress  EYES: Eyes grossly normal to inspection.  No discharge or erythema, or obvious scleral/conjunctival abnormalities.  RESP: No audible wheeze, cough, or visible cyanosis.    SKIN: Visible skin clear. No significant rash, abnormal pigmentation or lesions.  NEURO: Cranial nerves grossly intact.  Mentation and speech appropriate for age.  PSYCH: Appropriate affect, tone, and pace of words    The longitudinal plan of care for the diagnosis(es)/condition(s) as documented were addressed during this visit. Due to the added complexity in care, I will continue to support Clarence in the subsequent management and with ongoing continuity of care.      Video-Visit Details    Type of service:  Video Visit   Originating Location (pt. Location): Home    Distant Location (provider location):  On-site  Platform used for Video Visit: Roland  Signed Electronically by: Tay Christiansen MD, MD    Answers submitted by the patient for this visit:  Patient Health Questionnaire (Submitted on 12/3/2024)  If you checked off any problems, how difficult have these problems made it for you to do your work, take care of things at home, or get along with other people?: Somewhat difficult  PHQ9 TOTAL SCORE: 6    "

## 2025-02-07 ENCOUNTER — MYC MEDICAL ADVICE (OUTPATIENT)
Dept: FAMILY MEDICINE | Facility: CLINIC | Age: 40
End: 2025-02-07
Payer: COMMERCIAL

## 2025-02-07 DIAGNOSIS — Z11.3 SCREENING FOR STDS (SEXUALLY TRANSMITTED DISEASES): Primary | ICD-10-CM

## 2025-02-10 NOTE — TELEPHONE ENCOUNTER
Dr. Christiansen - patient requesting STI panel. Was seen 12/4/2024 for virtual visit. Would you be in agreement with ordering or would you prefer an e-visit be submitted?    BRADLEY FinchN, PHN, AMB-BC (she/her)  [facility] RN

## 2025-03-03 ENCOUNTER — LAB (OUTPATIENT)
Dept: LAB | Facility: CLINIC | Age: 40
End: 2025-03-03
Payer: COMMERCIAL

## 2025-03-03 DIAGNOSIS — Z11.3 SCREENING FOR STDS (SEXUALLY TRANSMITTED DISEASES): ICD-10-CM

## 2025-03-03 LAB
HBV SURFACE AB SERPL IA-ACNC: >1000 M[IU]/ML
HBV SURFACE AB SERPL IA-ACNC: REACTIVE M[IU]/ML
HBV SURFACE AG SERPL QL IA: NONREACTIVE
HCV AB SERPL QL IA: NONREACTIVE
HIV 1+2 AB+HIV1 P24 AG SERPL QL IA: NONREACTIVE
T PALLIDUM AB SER QL: NONREACTIVE

## 2025-03-03 PROCEDURE — 86803 HEPATITIS C AB TEST: CPT

## 2025-03-03 PROCEDURE — 87491 CHLMYD TRACH DNA AMP PROBE: CPT

## 2025-03-03 PROCEDURE — 87389 HIV-1 AG W/HIV-1&-2 AB AG IA: CPT

## 2025-03-03 PROCEDURE — 36415 COLL VENOUS BLD VENIPUNCTURE: CPT

## 2025-03-03 PROCEDURE — 86706 HEP B SURFACE ANTIBODY: CPT

## 2025-03-03 PROCEDURE — 86780 TREPONEMA PALLIDUM: CPT

## 2025-03-03 PROCEDURE — 87340 HEPATITIS B SURFACE AG IA: CPT

## 2025-03-03 PROCEDURE — 87591 N.GONORRHOEAE DNA AMP PROB: CPT

## 2025-03-04 LAB
C TRACH DNA SPEC QL NAA+PROBE: NEGATIVE
N GONORRHOEA DNA SPEC QL NAA+PROBE: NEGATIVE
SPECIMEN TYPE: NORMAL
SPECIMEN TYPE: NORMAL

## 2025-03-04 NOTE — RESULT ENCOUNTER NOTE
All the STD screening test are within normal limit which is reassuring.    Tay Christiansen MD  [facility]

## 2025-03-26 ENCOUNTER — OFFICE VISIT (OUTPATIENT)
Dept: FAMILY MEDICINE | Facility: CLINIC | Age: 40
End: 2025-03-26
Payer: COMMERCIAL

## 2025-03-26 VITALS
OXYGEN SATURATION: 98 % | HEIGHT: 74 IN | RESPIRATION RATE: 16 BRPM | BODY MASS INDEX: 28.08 KG/M2 | WEIGHT: 218.8 LBS | DIASTOLIC BLOOD PRESSURE: 84 MMHG | SYSTOLIC BLOOD PRESSURE: 124 MMHG | TEMPERATURE: 97.8 F | HEART RATE: 86 BPM

## 2025-03-26 DIAGNOSIS — F90.0 ATTENTION DEFICIT HYPERACTIVITY DISORDER (ADHD), PREDOMINANTLY INATTENTIVE TYPE: ICD-10-CM

## 2025-03-26 PROCEDURE — 1126F AMNT PAIN NOTED NONE PRSNT: CPT | Performed by: FAMILY MEDICINE

## 2025-03-26 PROCEDURE — G2211 COMPLEX E/M VISIT ADD ON: HCPCS | Performed by: FAMILY MEDICINE

## 2025-03-26 PROCEDURE — 3074F SYST BP LT 130 MM HG: CPT | Performed by: FAMILY MEDICINE

## 2025-03-26 PROCEDURE — 3079F DIAST BP 80-89 MM HG: CPT | Performed by: FAMILY MEDICINE

## 2025-03-26 PROCEDURE — 99213 OFFICE O/P EST LOW 20 MIN: CPT | Performed by: FAMILY MEDICINE

## 2025-03-26 RX ORDER — DEXTROAMPHETAMINE SACCHARATE, AMPHETAMINE ASPARTATE MONOHYDRATE, DEXTROAMPHETAMINE SULFATE AND AMPHETAMINE SULFATE 5; 5; 5; 5 MG/1; MG/1; MG/1; MG/1
20 CAPSULE, EXTENDED RELEASE ORAL DAILY
Qty: 30 CAPSULE | Refills: 0 | Status: CANCELLED | OUTPATIENT
Start: 2025-03-26

## 2025-03-26 RX ORDER — DEXTROAMPHETAMINE SACCHARATE, AMPHETAMINE ASPARTATE, DEXTROAMPHETAMINE SULFATE AND AMPHETAMINE SULFATE 1.25; 1.25; 1.25; 1.25 MG/1; MG/1; MG/1; MG/1
TABLET ORAL
Qty: 30 TABLET | Refills: 0 | Status: SHIPPED | OUTPATIENT
Start: 2025-04-25

## 2025-03-26 RX ORDER — DEXTROAMPHETAMINE SACCHARATE, AMPHETAMINE ASPARTATE MONOHYDRATE, DEXTROAMPHETAMINE SULFATE AND AMPHETAMINE SULFATE 5; 5; 5; 5 MG/1; MG/1; MG/1; MG/1
20 CAPSULE, EXTENDED RELEASE ORAL DAILY
Qty: 30 CAPSULE | Refills: 0 | Status: SHIPPED | OUTPATIENT
Start: 2025-03-26 | End: 2025-04-25

## 2025-03-26 RX ORDER — DEXTROAMPHETAMINE SACCHARATE, AMPHETAMINE ASPARTATE, DEXTROAMPHETAMINE SULFATE AND AMPHETAMINE SULFATE 1.25; 1.25; 1.25; 1.25 MG/1; MG/1; MG/1; MG/1
TABLET ORAL
Qty: 30 TABLET | Refills: 0 | Status: SHIPPED | OUTPATIENT
Start: 2025-03-26

## 2025-03-26 RX ORDER — DEXTROAMPHETAMINE SACCHARATE, AMPHETAMINE ASPARTATE MONOHYDRATE, DEXTROAMPHETAMINE SULFATE AND AMPHETAMINE SULFATE 5; 5; 5; 5 MG/1; MG/1; MG/1; MG/1
20 CAPSULE, EXTENDED RELEASE ORAL DAILY
Qty: 30 CAPSULE | Refills: 0 | Status: SHIPPED | OUTPATIENT
Start: 2025-04-25 | End: 2025-05-25

## 2025-03-26 RX ORDER — DEXTROAMPHETAMINE SACCHARATE, AMPHETAMINE ASPARTATE MONOHYDRATE, DEXTROAMPHETAMINE SULFATE AND AMPHETAMINE SULFATE 5; 5; 5; 5 MG/1; MG/1; MG/1; MG/1
20 CAPSULE, EXTENDED RELEASE ORAL DAILY
Qty: 30 CAPSULE | Refills: 0 | Status: SHIPPED | OUTPATIENT
Start: 2025-05-25 | End: 2025-06-24

## 2025-03-26 ASSESSMENT — PAIN SCALES - GENERAL: PAINLEVEL_OUTOF10: NO PAIN (0)

## 2025-03-26 NOTE — PROGRESS NOTES
"  Assessment & Plan     Attention deficit hyperactivity disorder (ADHD), predominantly inattentive type  Patient is tolerating current medication without any major side effects of concerns and current dose seems reasonable too.  Current medication regime is effective. Continue current treatment without any changes.   - amphetamine-dextroamphetamine (ADDERALL) 5 MG tablet; Take 1 pill around 1 pm  - amphetamine-dextroamphetamine (ADDERALL) 5 MG tablet; Take 1 pill at 1 pm.  - amphetamine-dextroamphetamine (ADDERALL XR) 20 MG 24 hr capsule; Take 1 capsule (20 mg) by mouth daily.  - amphetamine-dextroamphetamine (ADDERALL XR) 20 MG 24 hr capsule; Take 1 capsule (20 mg) by mouth daily.  - amphetamine-dextroamphetamine (ADDERALL XR) 20 MG 24 hr capsule; Take 1 capsule (20 mg) by mouth daily.          BMI  Estimated body mass index is 28.02 kg/m  as calculated from the following:    Height as of this encounter: 1.882 m (6' 2.09\").    Weight as of this encounter: 99.2 kg (218 lb 12.8 oz).   Weight management plan: Discussed healthy diet and exercise guidelines      Follow up in 3 months. Virtual visit is fine.     Subjective   Clarence is a 40 year old, presenting for the following health issues:  Recheck Medication      3/26/2025     3:11 PM   Additional Questions   Roomed by Elena GRAHAM     History of Present Illness       Reason for visit:  Medication follow up    He eats 2-3 servings of fruits and vegetables daily.He consumes 2 sweetened beverage(s) daily.He exercises with enough effort to increase his heart rate 10 to 19 minutes per day.  He exercises with enough effort to increase his heart rate 3 or less days per week.   He is taking medications regularly.      Had adderall this morning.     Adderall 20mg every morning. Now and then 5 mg in the afternoon around 3 pm. Working well.   Helps to be calm. No sleep issues, palpitations, chest pain, stomach pain.         Objective    /84   Pulse 86   Temp 97.8  F (36.6 " " C) (Temporal)   Resp 16   Ht 1.882 m (6' 2.09\")   Wt 99.2 kg (218 lb 12.8 oz)   SpO2 98%   BMI 28.02 kg/m    Body mass index is 28.02 kg/m .  Physical Exam   Lungs clear   Heart RRR    The longitudinal plan of care for the diagnosis(es)/condition(s) as documented were addressed during this visit. Due to the added complexity in care, I will continue to support Clarence in the subsequent management and with ongoing continuity of care.          Signed Electronically by: Tay Christiansen MD, MD    "

## 2025-03-26 NOTE — PATIENT INSTRUCTIONS
Urology [phone]     =======================================  FYI:     System will autorelease results as soon as they are available. I usually wait for all results to be ready before sending you a comment or message.  Be assured I will review and comment on all of your results as soon as I can.     I am at [facility]  ([phone]) usually Monday, Tuesday and Wednesday.   Messages, evisits, phone calls received on Thursday and Friday may not get responded very urgently but I will try to respond as soon as possible.     You can schedule a video visit through this link Video Visits ([email or web address])     Sign up for [employer] ([email or web address]).  This will allow you to review your results, securely communicate with a provider, and schedule virtual visits as well.

## 2025-06-30 ENCOUNTER — VIRTUAL VISIT (OUTPATIENT)
Dept: FAMILY MEDICINE | Facility: CLINIC | Age: 40
End: 2025-06-30
Payer: COMMERCIAL

## 2025-06-30 DIAGNOSIS — F90.0 ATTENTION DEFICIT HYPERACTIVITY DISORDER (ADHD), PREDOMINANTLY INATTENTIVE TYPE: ICD-10-CM

## 2025-06-30 PROCEDURE — 98005 SYNCH AUDIO-VIDEO EST LOW 20: CPT | Performed by: FAMILY MEDICINE

## 2025-06-30 PROCEDURE — 1126F AMNT PAIN NOTED NONE PRSNT: CPT | Mod: 95 | Performed by: FAMILY MEDICINE

## 2025-06-30 RX ORDER — DEXTROAMPHETAMINE SACCHARATE, AMPHETAMINE ASPARTATE, DEXTROAMPHETAMINE SULFATE AND AMPHETAMINE SULFATE 1.25; 1.25; 1.25; 1.25 MG/1; MG/1; MG/1; MG/1
TABLET ORAL
Qty: 30 TABLET | Refills: 0 | Status: CANCELLED | OUTPATIENT
Start: 2025-06-30

## 2025-06-30 RX ORDER — DEXTROAMPHETAMINE SACCHARATE, AMPHETAMINE ASPARTATE, DEXTROAMPHETAMINE SULFATE AND AMPHETAMINE SULFATE 1.25; 1.25; 1.25; 1.25 MG/1; MG/1; MG/1; MG/1
5 TABLET ORAL DAILY
Qty: 30 TABLET | Refills: 0 | Status: SHIPPED | OUTPATIENT
Start: 2025-07-30 | End: 2025-08-29

## 2025-06-30 RX ORDER — DEXTROAMPHETAMINE SACCHARATE, AMPHETAMINE ASPARTATE, DEXTROAMPHETAMINE SULFATE AND AMPHETAMINE SULFATE 1.25; 1.25; 1.25; 1.25 MG/1; MG/1; MG/1; MG/1
5 TABLET ORAL DAILY
Qty: 30 TABLET | Refills: 0 | Status: SHIPPED | OUTPATIENT
Start: 2025-06-30 | End: 2025-07-30

## 2025-06-30 RX ORDER — DEXTROAMPHETAMINE SACCHARATE, AMPHETAMINE ASPARTATE MONOHYDRATE, DEXTROAMPHETAMINE SULFATE AND AMPHETAMINE SULFATE 5; 5; 5; 5 MG/1; MG/1; MG/1; MG/1
20 CAPSULE, EXTENDED RELEASE ORAL DAILY
Qty: 30 CAPSULE | Refills: 0 | Status: SHIPPED | OUTPATIENT
Start: 2025-08-29 | End: 2025-09-28

## 2025-06-30 RX ORDER — DEXTROAMPHETAMINE SACCHARATE, AMPHETAMINE ASPARTATE MONOHYDRATE, DEXTROAMPHETAMINE SULFATE AND AMPHETAMINE SULFATE 5; 5; 5; 5 MG/1; MG/1; MG/1; MG/1
20 CAPSULE, EXTENDED RELEASE ORAL DAILY
Qty: 30 CAPSULE | Refills: 0 | Status: SHIPPED | OUTPATIENT
Start: 2025-07-30 | End: 2025-08-29

## 2025-06-30 RX ORDER — DEXTROAMPHETAMINE SACCHARATE, AMPHETAMINE ASPARTATE, DEXTROAMPHETAMINE SULFATE AND AMPHETAMINE SULFATE 1.25; 1.25; 1.25; 1.25 MG/1; MG/1; MG/1; MG/1
5 TABLET ORAL DAILY
Qty: 30 TABLET | Refills: 0 | Status: SHIPPED | OUTPATIENT
Start: 2025-08-29 | End: 2025-09-28

## 2025-06-30 RX ORDER — DEXTROAMPHETAMINE SACCHARATE, AMPHETAMINE ASPARTATE MONOHYDRATE, DEXTROAMPHETAMINE SULFATE AND AMPHETAMINE SULFATE 5; 5; 5; 5 MG/1; MG/1; MG/1; MG/1
20 CAPSULE, EXTENDED RELEASE ORAL DAILY
Qty: 30 CAPSULE | Refills: 0 | Status: SHIPPED | OUTPATIENT
Start: 2025-06-30 | End: 2025-07-30

## 2025-06-30 NOTE — PROGRESS NOTES
Clarence is a 40 year old who is being evaluated via a billable video visit.    How would you like to obtain your AVS? MyChart  If the video visit is dropped, the invitation should be resent by: Text to cell phone: [phone]  Will anyone else be joining your video visit? No      Assessment & Plan     Attention deficit hyperactivity disorder (ADHD), predominantly inattentive type  PDMP checked.  Continue with the same Rx.  Using 5 mg dose as needed and 3 months of prescription should last more than 3 months.  - amphetamine-dextroamphetamine (ADDERALL XR) 20 MG 24 hr capsule; Take 1 capsule (20 mg) by mouth daily.  - amphetamine-dextroamphetamine (ADDERALL XR) 20 MG 24 hr capsule; Take 1 capsule (20 mg) by mouth daily.  - amphetamine-dextroamphetamine (ADDERALL XR) 20 MG 24 hr capsule; Take 1 capsule (20 mg) by mouth daily.  - amphetamine-dextroamphetamine (ADDERALL) 5 MG tablet; Take 1 tablet (5 mg) by mouth daily.  - amphetamine-dextroamphetamine (ADDERALL) 5 MG tablet; Take 1 tablet (5 mg) by mouth daily.  - amphetamine-dextroamphetamine (ADDERALL) 5 MG tablet; Take 1 tablet (5 mg) by mouth daily.    Follow-up in 3 months.    The longitudinal plan of care for the diagnosis(es)/condition(s) as documented were addressed during this visit. Due to the added complexity in care, I will continue to support Clarence in the subsequent management and with ongoing continuity of care.            Subjective   Clarence is a 40 year old, presenting for the following health issues:  Medication Refill (amphetamine-dextroamphetamine (ADDERALL) 5 MG tablet)    Medication Refill    History of Present Illness       Reason for visit:  Medication check in    He eats 2-3 servings of fruits and vegetables daily.He consumes 2 sweetened beverage(s) daily.He exercises with enough effort to increase his heart rate 10 to 19 minutes per day.  He exercises with enough effort to increase his heart rate 4 days per week.   He is taking medications  regularly.        Doing well.     No new concerns. Feels current regime is working well.   20mg xr every morning. No palpitation or headache.   5mg as needed. 3-5 per week.   No mood changes. No anxiety.   Alcohol around 2-3 drinks per week.         Objective    Vitals - Patient Reported  Systolic (Patient Reported):  (unknown)  Diastolic (Patient Reported):  (unknown)  Weight (Patient Reported): 99.8 kg (220 lb)  Pain Score: No Pain (0)      Vitals:  No vitals were obtained today due to virtual visit.    Physical Exam   GENERAL: alert and no distress  EYES: Eyes grossly normal to inspection.  No discharge or erythema, or obvious scleral/conjunctival abnormalities.  RESP: No audible wheeze, cough, or visible cyanosis.    SKIN: Visible skin clear. No significant rash, abnormal pigmentation or lesions.  NEURO: Cranial nerves grossly intact.  Mentation and speech appropriate for age.  PSYCH: Appropriate affect, tone, and pace of words      The longitudinal plan of care for the diagnosis(es)/condition(s) as documented were addressed during this visit. Due to the added complexity in care, I will continue to support Clarence in the subsequent management and with ongoing continuity of care.      Video-Visit Details    Type of service:  Video Visit   Originating Location (pt. Location): Home    Distant Location (provider location):  On-site  Platform used for Video Visit: Roland  Signed Electronically by: Tay Christiansen MD, MD